# Patient Record
Sex: FEMALE | Race: OTHER | HISPANIC OR LATINO | Employment: FULL TIME | URBAN - METROPOLITAN AREA
[De-identification: names, ages, dates, MRNs, and addresses within clinical notes are randomized per-mention and may not be internally consistent; named-entity substitution may affect disease eponyms.]

---

## 2017-01-16 ENCOUNTER — ALLSCRIPTS OFFICE VISIT (OUTPATIENT)
Dept: OTHER | Facility: OTHER | Age: 32
End: 2017-01-16

## 2017-02-08 ENCOUNTER — ALLSCRIPTS OFFICE VISIT (OUTPATIENT)
Dept: OTHER | Facility: OTHER | Age: 32
End: 2017-02-08

## 2017-02-15 ENCOUNTER — ALLSCRIPTS OFFICE VISIT (OUTPATIENT)
Dept: OTHER | Facility: OTHER | Age: 32
End: 2017-02-15

## 2017-02-21 ENCOUNTER — APPOINTMENT (EMERGENCY)
Dept: RADIOLOGY | Facility: HOSPITAL | Age: 32
End: 2017-02-21
Payer: COMMERCIAL

## 2017-02-21 ENCOUNTER — HOSPITAL ENCOUNTER (EMERGENCY)
Facility: HOSPITAL | Age: 32
Discharge: HOME | End: 2017-02-21
Attending: EMERGENCY MEDICINE
Payer: COMMERCIAL

## 2017-02-21 VITALS
RESPIRATION RATE: 18 BRPM | SYSTOLIC BLOOD PRESSURE: 112 MMHG | DIASTOLIC BLOOD PRESSURE: 59 MMHG | TEMPERATURE: 97.7 F | HEART RATE: 70 BPM | OXYGEN SATURATION: 99 %

## 2017-02-21 DIAGNOSIS — K21.9 ACID REFLUX: ICD-10-CM

## 2017-02-21 DIAGNOSIS — R51.9 HEADACHE: Primary | ICD-10-CM

## 2017-02-21 LAB
ANION GAP SERPL CALCULATED.3IONS-SCNC: 9 MMOL/L (ref 4–13)
ATRIAL RATE: 64 BPM
BASOPHILS # BLD AUTO: 0 THOUSANDS/ΜL (ref 0–0.1)
BASOPHILS NFR BLD AUTO: 0 % (ref 0–1)
BILIRUB UR QL STRIP: NEGATIVE
BUN SERPL-MCNC: 12 MG/DL (ref 5–25)
CALCIUM SERPL-MCNC: 8.7 MG/DL (ref 8.3–10.1)
CHLORIDE SERPL-SCNC: 105 MMOL/L (ref 100–108)
CLARITY UR: CLEAR
CO2 SERPL-SCNC: 28 MMOL/L (ref 21–32)
COLOR UR: YELLOW
CREAT SERPL-MCNC: 0.66 MG/DL (ref 0.6–1.3)
EOSINOPHIL # BLD AUTO: 0.08 THOUSAND/ΜL (ref 0–0.61)
EOSINOPHIL NFR BLD AUTO: 1 % (ref 0–6)
ERYTHROCYTE [DISTWIDTH] IN BLOOD BY AUTOMATED COUNT: 13.1 % (ref 11.6–15.1)
GFR SERPL CREATININE-BSD FRML MDRD: >60 ML/MIN/1.73SQ M
GLUCOSE SERPL-MCNC: 70 MG/DL (ref 65–140)
GLUCOSE UR STRIP-MCNC: NEGATIVE MG/DL
HCG UR QL: NEGATIVE
HCT VFR BLD AUTO: 45 % (ref 34.8–46.1)
HGB BLD-MCNC: 16 G/DL (ref 11.5–15.4)
HGB UR QL STRIP.AUTO: NEGATIVE
KETONES UR STRIP-MCNC: ABNORMAL MG/DL
LEUKOCYTE ESTERASE UR QL STRIP: NEGATIVE
LYMPHOCYTES # BLD AUTO: 2.24 THOUSANDS/ΜL (ref 0.6–4.47)
LYMPHOCYTES NFR BLD AUTO: 25 % (ref 14–44)
MAGNESIUM SERPL-MCNC: 1.7 MG/DL (ref 1.6–2.6)
MCH RBC QN AUTO: 31.3 PG (ref 26.8–34.3)
MCHC RBC AUTO-ENTMCNC: 35.6 G/DL (ref 31.4–37.4)
MCV RBC AUTO: 88 FL (ref 82–98)
MONOCYTES # BLD AUTO: 0.78 THOUSAND/ΜL (ref 0.17–1.22)
MONOCYTES NFR BLD AUTO: 9 % (ref 4–12)
NEUTROPHILS # BLD AUTO: 5.78 THOUSANDS/ΜL (ref 1.85–7.62)
NEUTS SEG NFR BLD AUTO: 65 % (ref 43–75)
NITRITE UR QL STRIP: NEGATIVE
NRBC BLD AUTO-RTO: 0 /100 WBCS
P AXIS: 40 DEGREES
PH UR STRIP.AUTO: 5.5 [PH] (ref 4.5–8)
PLATELET # BLD AUTO: 245 THOUSANDS/UL (ref 149–390)
PMV BLD AUTO: 10.2 FL (ref 8.9–12.7)
POTASSIUM SERPL-SCNC: 3.8 MMOL/L (ref 3.5–5.3)
PR INTERVAL: 160 MS
PROT UR STRIP-MCNC: NEGATIVE MG/DL
QRS AXIS: 50 DEGREES
QRSD INTERVAL: 70 MS
QT INTERVAL: 364 MS
QTC INTERVAL: 375 MS
RBC # BLD AUTO: 5.11 MILLION/UL (ref 3.81–5.12)
SODIUM SERPL-SCNC: 142 MMOL/L (ref 136–145)
SP GR UR STRIP.AUTO: 1.02 (ref 1–1.03)
T WAVE AXIS: 57 DEGREES
UROBILINOGEN UR QL STRIP.AUTO: 0.2 E.U./DL
VENTRICULAR RATE: 64 BPM
WBC # BLD AUTO: 8.88 THOUSAND/UL (ref 4.31–10.16)

## 2017-02-21 PROCEDURE — 36415 COLL VENOUS BLD VENIPUNCTURE: CPT | Performed by: EMERGENCY MEDICINE

## 2017-02-21 PROCEDURE — 85025 COMPLETE CBC W/AUTO DIFF WBC: CPT | Performed by: EMERGENCY MEDICINE

## 2017-02-21 PROCEDURE — 81025 URINE PREGNANCY TEST: CPT | Performed by: EMERGENCY MEDICINE

## 2017-02-21 PROCEDURE — 83735 ASSAY OF MAGNESIUM: CPT | Performed by: EMERGENCY MEDICINE

## 2017-02-21 PROCEDURE — 93005 ELECTROCARDIOGRAM TRACING: CPT | Performed by: EMERGENCY MEDICINE

## 2017-02-21 PROCEDURE — 81002 URINALYSIS NONAUTO W/O SCOPE: CPT | Performed by: EMERGENCY MEDICINE

## 2017-02-21 PROCEDURE — 80048 BASIC METABOLIC PNL TOTAL CA: CPT | Performed by: EMERGENCY MEDICINE

## 2017-02-21 PROCEDURE — 96375 TX/PRO/DX INJ NEW DRUG ADDON: CPT

## 2017-02-21 PROCEDURE — 99285 EMERGENCY DEPT VISIT HI MDM: CPT

## 2017-02-21 PROCEDURE — 96374 THER/PROPH/DIAG INJ IV PUSH: CPT

## 2017-02-21 PROCEDURE — 81003 URINALYSIS AUTO W/O SCOPE: CPT

## 2017-02-21 PROCEDURE — 96361 HYDRATE IV INFUSION ADD-ON: CPT

## 2017-02-21 PROCEDURE — 71020 HB CHEST X-RAY 2VW FRONTAL&LATL: CPT

## 2017-02-21 RX ORDER — FAMOTIDINE 20 MG/1
20 TABLET, FILM COATED ORAL 2 TIMES DAILY
Qty: 40 TABLET | Refills: 0 | Status: SHIPPED | OUTPATIENT
Start: 2017-02-21 | End: 2021-09-23 | Stop reason: ALTCHOICE

## 2017-02-21 RX ORDER — DIPHENHYDRAMINE HYDROCHLORIDE 50 MG/ML
25 INJECTION INTRAMUSCULAR; INTRAVENOUS ONCE
Status: COMPLETED | OUTPATIENT
Start: 2017-02-21 | End: 2017-02-21

## 2017-02-21 RX ORDER — NAPROXEN 500 MG/1
500 TABLET ORAL 2 TIMES DAILY WITH MEALS
Qty: 20 TABLET | Refills: 0 | Status: SHIPPED | OUTPATIENT
Start: 2017-02-21 | End: 2021-09-23

## 2017-02-21 RX ORDER — KETOROLAC TROMETHAMINE 30 MG/ML
15 INJECTION, SOLUTION INTRAMUSCULAR; INTRAVENOUS ONCE
Status: COMPLETED | OUTPATIENT
Start: 2017-02-21 | End: 2017-02-21

## 2017-02-21 RX ORDER — METOCLOPRAMIDE HYDROCHLORIDE 5 MG/ML
10 INJECTION INTRAMUSCULAR; INTRAVENOUS ONCE
Status: COMPLETED | OUTPATIENT
Start: 2017-02-21 | End: 2017-02-21

## 2017-02-21 RX ADMIN — DIPHENHYDRAMINE HYDROCHLORIDE 25 MG: 50 INJECTION, SOLUTION INTRAMUSCULAR; INTRAVENOUS at 08:01

## 2017-02-21 RX ADMIN — SODIUM CHLORIDE 1000 ML: 0.9 INJECTION, SOLUTION INTRAVENOUS at 07:59

## 2017-02-21 RX ADMIN — FAMOTIDINE 20 MG: 10 INJECTION, SOLUTION INTRAVENOUS at 08:02

## 2017-02-21 RX ADMIN — METOCLOPRAMIDE HYDROCHLORIDE 10 MG: 5 INJECTION INTRAMUSCULAR; INTRAVENOUS at 08:05

## 2017-02-21 RX ADMIN — KETOROLAC TROMETHAMINE 15 MG: 30 INJECTION, SOLUTION INTRAMUSCULAR at 08:00

## 2017-04-24 ENCOUNTER — ALLSCRIPTS OFFICE VISIT (OUTPATIENT)
Dept: OTHER | Facility: OTHER | Age: 32
End: 2017-04-24

## 2017-04-24 LAB — HCG, QUALITATIVE (HISTORICAL): NEGATIVE

## 2017-07-18 ENCOUNTER — ALLSCRIPTS OFFICE VISIT (OUTPATIENT)
Dept: OTHER | Facility: OTHER | Age: 32
End: 2017-07-18

## 2017-07-18 LAB
BILIRUB UR QL STRIP: NORMAL
CLARITY UR: NORMAL
COLOR UR: YELLOW
HGB UR QL STRIP.AUTO: NORMAL
LEUKOCYTE ESTERASE UR QL STRIP: NORMAL
NITRITE UR QL STRIP: POSITIVE
PROT UR STRIP-MCNC: NORMAL MG/DL
UROBILINOGEN UR QL STRIP.AUTO: 0.2

## 2018-01-13 VITALS — DIASTOLIC BLOOD PRESSURE: 52 MMHG | BODY MASS INDEX: 25.06 KG/M2 | WEIGHT: 137 LBS | SYSTOLIC BLOOD PRESSURE: 102 MMHG

## 2018-01-14 VITALS — WEIGHT: 137 LBS | SYSTOLIC BLOOD PRESSURE: 97 MMHG | DIASTOLIC BLOOD PRESSURE: 67 MMHG | BODY MASS INDEX: 22.8 KG/M2

## 2018-01-14 VITALS — BODY MASS INDEX: 22.63 KG/M2 | WEIGHT: 136 LBS | SYSTOLIC BLOOD PRESSURE: 133 MMHG | DIASTOLIC BLOOD PRESSURE: 75 MMHG

## 2018-01-14 VITALS — SYSTOLIC BLOOD PRESSURE: 112 MMHG | WEIGHT: 137 LBS | BODY MASS INDEX: 22.8 KG/M2 | DIASTOLIC BLOOD PRESSURE: 72 MMHG

## 2018-01-14 VITALS — DIASTOLIC BLOOD PRESSURE: 71 MMHG | SYSTOLIC BLOOD PRESSURE: 108 MMHG | WEIGHT: 127 LBS | BODY MASS INDEX: 21.13 KG/M2

## 2018-01-17 NOTE — MISCELLANEOUS
Provider Comments  Provider Comments:   PT NO SHOW FOR PELVIC PAIN      Signatures   Electronically signed by : Avril Craven, ; Feb 16 2016  9:47AM EST                       (Author)    Electronically signed by :  MARILIN Ness; Feb 16 2016 11:53AM EST                       (Acknowledgement)    Electronically signed by : Ever Coker MD; Feb 18 2016  9:04AM EST

## 2018-07-17 ENCOUNTER — HOSPITAL ENCOUNTER (EMERGENCY)
Facility: HOSPITAL | Age: 33
Discharge: HOME | End: 2018-07-17
Attending: EMERGENCY MEDICINE | Admitting: EMERGENCY MEDICINE

## 2018-07-17 VITALS
TEMPERATURE: 97.8 F | DIASTOLIC BLOOD PRESSURE: 55 MMHG | RESPIRATION RATE: 18 BRPM | OXYGEN SATURATION: 100 % | HEART RATE: 76 BPM | SYSTOLIC BLOOD PRESSURE: 98 MMHG

## 2018-07-17 DIAGNOSIS — O26.93: ICD-10-CM

## 2018-07-17 DIAGNOSIS — R11.0 NAUSEA: ICD-10-CM

## 2018-07-17 DIAGNOSIS — R55 SYNCOPE: Primary | ICD-10-CM

## 2018-07-17 LAB
ALBUMIN SERPL BCP-MCNC: 2.7 G/DL (ref 3.5–5)
ALP SERPL-CCNC: 58 U/L (ref 46–116)
ALT SERPL W P-5'-P-CCNC: 17 U/L (ref 12–78)
ANION GAP SERPL CALCULATED.3IONS-SCNC: 5 MMOL/L (ref 4–13)
AST SERPL W P-5'-P-CCNC: 13 U/L (ref 5–45)
ATRIAL RATE: 78 BPM
BASOPHILS # BLD AUTO: 0.02 THOUSANDS/ΜL (ref 0–0.1)
BASOPHILS NFR BLD AUTO: 0 % (ref 0–1)
BILIRUB SERPL-MCNC: 0.36 MG/DL (ref 0.2–1)
BILIRUB UR QL STRIP: NEGATIVE
BUN SERPL-MCNC: 7 MG/DL (ref 5–25)
CALCIUM SERPL-MCNC: 8.1 MG/DL (ref 8.3–10.1)
CHLORIDE SERPL-SCNC: 109 MMOL/L (ref 100–108)
CLARITY UR: CLEAR
CO2 SERPL-SCNC: 23 MMOL/L (ref 21–32)
COLOR UR: YELLOW
COLOR, POC: NORMAL
CREAT SERPL-MCNC: 0.46 MG/DL (ref 0.6–1.3)
EOSINOPHIL # BLD AUTO: 0.03 THOUSAND/ΜL (ref 0–0.61)
EOSINOPHIL NFR BLD AUTO: 0 % (ref 0–6)
ERYTHROCYTE [DISTWIDTH] IN BLOOD BY AUTOMATED COUNT: 13.1 % (ref 11.6–15.1)
GFR SERPL CREATININE-BSD FRML MDRD: 132 ML/MIN/1.73SQ M
GLUCOSE SERPL-MCNC: 88 MG/DL (ref 65–140)
GLUCOSE UR STRIP-MCNC: NEGATIVE MG/DL
HCT VFR BLD AUTO: 37.6 % (ref 34.8–46.1)
HGB BLD-MCNC: 12.6 G/DL (ref 11.5–15.4)
HGB UR QL STRIP.AUTO: NEGATIVE
IMM GRANULOCYTES # BLD AUTO: 0.12 THOUSAND/UL (ref 0–0.2)
IMM GRANULOCYTES NFR BLD AUTO: 1 % (ref 0–2)
KETONES UR STRIP-MCNC: NEGATIVE MG/DL
LEUKOCYTE ESTERASE UR QL STRIP: NEGATIVE
LIPASE SERPL-CCNC: 137 U/L (ref 73–393)
LYMPHOCYTES # BLD AUTO: 1.82 THOUSANDS/ΜL (ref 0.6–4.47)
LYMPHOCYTES NFR BLD AUTO: 15 % (ref 14–44)
MCH RBC QN AUTO: 30.5 PG (ref 26.8–34.3)
MCHC RBC AUTO-ENTMCNC: 33.5 G/DL (ref 31.4–37.4)
MCV RBC AUTO: 91 FL (ref 82–98)
MONOCYTES # BLD AUTO: 0.9 THOUSAND/ΜL (ref 0.17–1.22)
MONOCYTES NFR BLD AUTO: 7 % (ref 4–12)
NEUTROPHILS # BLD AUTO: 9.6 THOUSANDS/ΜL (ref 1.85–7.62)
NEUTS SEG NFR BLD AUTO: 77 % (ref 43–75)
NITRITE UR QL STRIP: NEGATIVE
NRBC BLD AUTO-RTO: 0 /100 WBCS
P AXIS: 26 DEGREES
PH UR STRIP.AUTO: 7 [PH] (ref 4.5–8)
PLATELET # BLD AUTO: 205 THOUSANDS/UL (ref 149–390)
PMV BLD AUTO: 10 FL (ref 8.9–12.7)
POTASSIUM SERPL-SCNC: 3.5 MMOL/L (ref 3.5–5.3)
PR INTERVAL: 140 MS
PROT SERPL-MCNC: 6.4 G/DL (ref 6.4–8.2)
PROT UR STRIP-MCNC: NEGATIVE MG/DL
QRS AXIS: 42 DEGREES
QRSD INTERVAL: 72 MS
QT INTERVAL: 360 MS
QTC INTERVAL: 410 MS
RBC # BLD AUTO: 4.13 MILLION/UL (ref 3.81–5.12)
SODIUM SERPL-SCNC: 137 MMOL/L (ref 136–145)
SP GR UR STRIP.AUTO: 1.01 (ref 1–1.03)
T WAVE AXIS: 46 DEGREES
UROBILINOGEN UR QL STRIP.AUTO: 0.2 E.U./DL
VENTRICULAR RATE: 78 BPM
WBC # BLD AUTO: 12.49 THOUSAND/UL (ref 4.31–10.16)

## 2018-07-17 PROCEDURE — 85025 COMPLETE CBC W/AUTO DIFF WBC: CPT | Performed by: EMERGENCY MEDICINE

## 2018-07-17 PROCEDURE — 36415 COLL VENOUS BLD VENIPUNCTURE: CPT | Performed by: EMERGENCY MEDICINE

## 2018-07-17 PROCEDURE — 93005 ELECTROCARDIOGRAM TRACING: CPT

## 2018-07-17 PROCEDURE — 96374 THER/PROPH/DIAG INJ IV PUSH: CPT

## 2018-07-17 PROCEDURE — 99284 EMERGENCY DEPT VISIT MOD MDM: CPT

## 2018-07-17 PROCEDURE — 81003 URINALYSIS AUTO W/O SCOPE: CPT

## 2018-07-17 PROCEDURE — 83690 ASSAY OF LIPASE: CPT | Performed by: EMERGENCY MEDICINE

## 2018-07-17 PROCEDURE — 93010 ELECTROCARDIOGRAM REPORT: CPT | Performed by: INTERNAL MEDICINE

## 2018-07-17 PROCEDURE — 96361 HYDRATE IV INFUSION ADD-ON: CPT

## 2018-07-17 PROCEDURE — 80053 COMPREHEN METABOLIC PANEL: CPT | Performed by: EMERGENCY MEDICINE

## 2018-07-17 RX ORDER — ONDANSETRON 2 MG/ML
4 INJECTION INTRAMUSCULAR; INTRAVENOUS ONCE
Status: COMPLETED | OUTPATIENT
Start: 2018-07-17 | End: 2018-07-17

## 2018-07-17 RX ORDER — ONDANSETRON 4 MG/1
4 TABLET, ORALLY DISINTEGRATING ORAL EVERY 6 HOURS PRN
Qty: 20 TABLET | Refills: 0 | Status: SHIPPED | OUTPATIENT
Start: 2018-07-17 | End: 2021-09-23 | Stop reason: ALTCHOICE

## 2018-07-17 RX ADMIN — SODIUM CHLORIDE 1000 ML: 0.9 INJECTION, SOLUTION INTRAVENOUS at 08:49

## 2018-07-17 RX ADMIN — ONDANSETRON 4 MG: 2 INJECTION, SOLUTION INTRAMUSCULAR; INTRAVENOUS at 08:49

## 2018-07-17 NOTE — ED PROVIDER NOTES
History  Chief Complaint   Patient presents with    Syncope     syncopal episode at work, witnessed by co-workers, states she was shaking lasting 10-20 seconds, upon waking she had episode of nausea and dry-heaving  patient is 26 weeks pregnant, and states that she has had no complications with pregnancy,      29 F who is 26 weeks pregnant presenting after syncopal event while at work at [employer] today  C/o nausea but no pain  Pt was lowered to the ground by co-workers and did not sustain any trauma  No previous episodes of syncope  Denies headache, abd cramping, vaginal bleeding  Prior to Admission Medications   Prescriptions Last Dose Informant Patient Reported? Taking?   famotidine (PEPCID) 20 mg tablet   No No   Sig: Take 1 tablet by mouth 2 (two) times a day for 20 days   naproxen (NAPROSYN) 500 mg tablet   No No   Sig: Take 1 tablet by mouth 2 (two) times a day with meals for 10 days      Facility-Administered Medications: None       History reviewed  No pertinent past medical history  Past Surgical History:   Procedure Laterality Date     SECTION         Family History   Problem Relation Age of Onset    Family history unknown: Yes     I have reviewed and agree with the history as documented  Social History   Substance Use Topics    Smoking status: Never Smoker    Smokeless tobacco: Not on file    Alcohol use No        Review of Systems   Constitutional: Negative for appetite change, fatigue, fever and unexpected weight change  HENT: Negative for congestion, rhinorrhea and sore throat  Eyes: Negative for visual disturbance  Respiratory: Negative for apnea, cough, chest tightness, shortness of breath and wheezing  Cardiovascular: Negative for chest pain, palpitations and leg swelling  Gastrointestinal: Positive for abdominal pain  Negative for constipation, diarrhea, nausea and vomiting          Mid-epigatric pain   Genitourinary: Negative for difficulty urinating, dysuria, frequency, menstrual problem, pelvic pain, vaginal bleeding and vaginal discharge  Musculoskeletal: Negative for back pain and neck pain  Skin: Negative for pallor and rash  Neurological: Negative for dizziness, syncope, light-headedness and headaches  Psychiatric/Behavioral: Negative for sleep disturbance  All other systems reviewed and are negative  Physical Exam  Physical Exam   Constitutional: She is oriented to person, place, and time  She appears well-developed and well-nourished  HENT:   Head: Normocephalic and atraumatic  Mouth/Throat: Oropharynx is clear and moist    Eyes: Conjunctivae are normal  No scleral icterus  Neck: Normal range of motion  Neck supple  Cardiovascular: Normal rate, regular rhythm and normal heart sounds  Exam reveals no gallop and no friction rub  No murmur heard  Pulmonary/Chest: Effort normal and breath sounds normal  No respiratory distress  She has no wheezes  She has no rales  Abdominal: Soft  She exhibits no mass  There is no tenderness  There is no rebound and no guarding  No hernia  Gravid abdomen with uterus palpated above umbilicus   Musculoskeletal: Normal range of motion  She exhibits no edema, tenderness or deformity  Neurological: She is alert and oriented to person, place, and time  Skin: Skin is warm and dry  Capillary refill takes less than 2 seconds  No rash noted  No erythema  No pallor  Psychiatric: She has a normal mood and affect  Nursing note and vitals reviewed      Patient has + fetal movement with HR 150s    Vital Signs  ED Triage Vitals   Temperature Pulse Respirations Blood Pressure SpO2   07/17/18 0833 07/17/18 0833 07/17/18 0833 07/17/18 0834 07/17/18 0833   97 8 °F (36 6 °C) 83 18 110/57 100 %      Temp Source Heart Rate Source Patient Position - Orthostatic VS BP Location FiO2 (%)   07/17/18 0833 07/17/18 0833 07/17/18 0833 07/17/18 0833 --   Oral Monitor Sitting Right arm       Pain Score       07/17/18 1907       8           Vitals:    07/17/18 0833 07/17/18 0834 07/17/18 1103   BP:  110/57 98/55   Pulse: 83  76   Patient Position - Orthostatic VS: Sitting  Sitting       Visual Acuity  Visual Acuity      Most Recent Value   L Pupil Size (mm)  3   R Pupil Size (mm)  3          ED Medications  Medications    EMS REPLENISHMENT MED ( Does not apply Given to EMS 7/17/18 0835)   sodium chloride 0 9 % bolus 1,000 mL (1,000 mL Intravenous New Bag 7/17/18 0849)   ondansetron (ZOFRAN) injection 4 mg (4 mg Intravenous Given 7/17/18 0849)       Diagnostic Studies  Results Reviewed     Procedure Component Value Units Date/Time    POCT urinalysis dipstick [[id number]]  (Normal) Resulted:  07/17/18 1103    Lab Status:  Final result Specimen:  Urine Updated:  07/17/18 1103     Color, UA see chart    ED Urine Macroscopic [[id number]] Collected:  07/17/18 1112    Lab Status:  Final result Specimen:  Urine Updated:  07/17/18 1102     Color, UA Yellow     Clarity, UA Clear     pH, UA 7 0     Leukocytes, UA Negative     Nitrite, UA Negative     Protein, UA Negative mg/dl      Glucose, UA Negative mg/dl      Ketones, UA Negative mg/dl      Urobilinogen, UA 0 2 E U /dl      Bilirubin, UA Negative     Blood, UA Negative     Specific Gravity, UA 1 015    Narrative:       CLINITEK RESULT    Comprehensive metabolic panel [[id number]]  (Abnormal) Collected:  07/17/18 0849    Lab Status:  Final result Specimen:  Blood from Arm, Left Updated:  07/17/18 0919     Sodium 137 mmol/L      Potassium 3 5 mmol/L      Chloride 109 (H) mmol/L      CO2 23 mmol/L      Anion Gap 5 mmol/L      BUN 7 mg/dL      Creatinine 0 46 (L) mg/dL      Glucose 88 mg/dL      Calcium 8 1 (L) mg/dL      AST 13 U/L      ALT 17 U/L      Alkaline Phosphatase 58 U/L      Total Protein 6 4 g/dL      Albumin 2 7 (L) g/dL      Total Bilirubin 0 36 mg/dL      eGFR 132 ml/min/1 73sq m     Narrative:         National Kidney Disease Education Program recommendations are as follows:  GFR calculation is accurate only with a steady state creatinine  Chronic Kidney disease less than 60 ml/min/1 73 sq  meters  Kidney failure less than 15 ml/min/1 73 sq  meters      Lipase [[id number]]  (Normal) Collected:  07/17/18 0849    Lab Status:  Final result Specimen:  Blood from Arm, Left Updated:  07/17/18 0919     Lipase 137 u/L     CBC and differential [[id number]]  (Abnormal) Collected:  07/17/18 0849    Lab Status:  Final result Specimen:  Blood from Arm, Left Updated:  07/17/18 0900     WBC 12 49 (H) Thousand/uL      RBC 4 13 Million/uL      Hemoglobin 12 6 g/dL      Hematocrit 37 6 %      MCV 91 fL      MCH 30 5 pg      MCHC 33 5 g/dL      RDW 13 1 %      MPV 10 0 fL      Platelets 497 Thousands/uL      nRBC 0 /100 WBCs      Neutrophils Relative 77 (H) %      Immat GRANS % 1 %      Lymphocytes Relative 15 %      Monocytes Relative 7 %      Eosinophils Relative 0 %      Basophils Relative 0 %      Neutrophils Absolute 9 60 (H) Thousands/µL      Immature Grans Absolute 0 12 Thousand/uL      Lymphocytes Absolute 1 82 Thousands/µL      Monocytes Absolute 0 90 Thousand/µL      Eosinophils Absolute 0 03 Thousand/µL      Basophils Absolute 0 02 Thousands/µL                  No orders to display              Procedures  ECG 12 Lead Documentation  Date/Time: 7/17/2018 8:38 AM  Performed by: Brigette Desir  Authorized by: Brigette Desir     Indications / Diagnosis:  Syncope  ECG reviewed by me, the ED Provider: yes    Patient location:  ED  Previous ECG:     Comparison to cardiac monitor: Yes    Rate:     ECG rate:  78    ECG rate assessment: normal    Rhythm:     Rhythm: sinus rhythm    Ectopy:     Ectopy: none    QRS:     QRS axis:  Normal    QRS intervals:  Normal  Conduction:     Conduction: normal    ST segments:     ST segments:  Normal  T waves:     T waves: normal             Phone Contacts  ED Phone Contact    ED Course                               [facility] Time    Disposition  Final diagnoses: Syncope   Abnormal pregnancy, third trimester   Nausea     Time reflects when diagnosis was documented in both MDM as applicable and the Disposition within this note     Time User Action Codes Description Comment    7/17/2018 11:10 AM Gennette Lock T Add [R55] Syncope     7/17/2018 11:10 AM Gennette Lock T Add [O26 93] Abnormal pregnancy, third trimester     7/17/2018 11:11 AM Gennette Lock T Add [R11 0] Nausea       ED Disposition     ED Disposition Condition Comment    Discharge  Francine Rodriguez discharge to home/self care  Condition at discharge: Stable        Follow-up Information     Follow up With Specialties Details Why Zeeshan Babin MD Internal Medicine Schedule an appointment as soon as possible for a visit  [phone]  [address]  [phone]            Patient's Medications   Discharge Prescriptions    ONDANSETRON (ZOFRAN-ODT) 4 MG DISINTEGRATING TABLET    Take 1 tablet (4 mg total) by mouth every 6 (six) hours as needed for nausea or vomiting       Start Date: 7/17/2018 End Date: --       Order Dose: 4 mg       Quantity: 20 tablet    Refills: 0     No discharge procedures on file      ED Provider  Electronically Signed by           Alejo Winston DO  07/17/18 2848

## 2018-07-17 NOTE — DISCHARGE INSTRUCTIONS
Náusea y vómito severo, cuidados ambulatorios   INFORMACIÓN GENERAL:   La náusea y vómito severo  empieza de repente, empeora rápidamente y dura un corto periodo de Ar  La náusea y el vómito pueden ser causados por el embarazo, el alcohol, madi infección o medicamentos  Síntomas relacionados comunes incluyen los siguientes:   · Fiebre    · Inflamación abdominal    · Dolor, sensibilidad o un bulto en el abdomen    · Sonidos salpicantes que se escuchan en hernandez estómago cuando usted se mueve  Busque cuidados inmediatos para los siguientes síntomas:   · Tay en hernandez vómito o heces    · Dolor repentino y severo en el pecho y parte superior del abdomen después de shauna vomitado muy brandon    · Mareos, sequedad en la boca y sed    · Muy poca orina o ausencia completa de la misma    · Debilidad muscular, calambres musculares y dificultad para respirar    · Latidos cardíacos más rápidos de lo normal    · Vómito por más de 50 horas  El tratamiento para la náusea y el vómito severo  puede incluir medicamentos para calmar hernandez estómago y parar el vómito  Es probable que usted necesite de líquidos intravenosos si está deshidratado  Maneje hernandez náusea y vómito:   · [place] líquidos según indicaciones, para prevenir la deshidratación  Pregunte cuánto líquido radha [employer] hawa y cuáles líquidos son mejores para usted  Es probable que usted necesite radha un líquido de rehidratación oral  Arabella líquido contiene agua, sales y azúcares que son todos necesarios para reemplazar los líquidos que el cuerpo ha perdido  Pregunte qué tipo de líquidos de rehidratación oral radha, cuánto radha y dónde conseguirlos  · Consuma comidas pequeñas con más frecuencia  Consuma cantidades pequeñas de alimentos cada 2 o 3 horas aunque no sienta hambre  Los alimentos en hernandez estómago pueden llegar a [employer]  · Evite el estrés  Busque formas de relajarse y [place] hernandez estrés   Los chris de agustín debidos al estrés pueden incluso causar náusea y vómito  Descanse y [employer]  Programe madi ayesha con hernandez proveedor de chris según indicaciones:  Anote sandra preguntas para que las recuerde gutierrez sandra citas de seguimiento  ACUERDOS SOBRE HERNANDEZ CUIDADO:   Usted tiene el derecho de participar en la planificación de hernandez cuidado  Aprenda todo lo que pueda sobre hernandez condición y carlos darle tratamiento  Discuta con sandra médicos sandra opciones de tratamiento para juntos decidir el cuidado que usted quiere recibir  Usted siempre tiene el derecho a rechazar hernandez tratamiento  Esta información es sólo para uso en educación  Hernandez intención no es darle un consejo médico sobre enfermedades o tratamientos  Colsulte con hernandez Carollee Tru farmacéutico antes de seguir cualquier régimen médico para saber si es seguro y efectivo para usted  © 2014 [address] is for End User's use only and may not be sold, redistributed or otherwise used for commercial purposes  All illustrations and images included in CareNotes® are the copyrighted property of [employer]  or Nicanor Hensley

## 2020-10-13 ENCOUNTER — HOSPITAL ENCOUNTER (EMERGENCY)
Facility: HOSPITAL | Age: 35
Discharge: HOME | End: 2020-10-13
Attending: EMERGENCY MEDICINE | Admitting: EMERGENCY MEDICINE
Payer: COMMERCIAL

## 2020-10-13 ENCOUNTER — APPOINTMENT (EMERGENCY)
Dept: RADIOLOGY | Facility: HOSPITAL | Age: 35
End: 2020-10-13
Payer: COMMERCIAL

## 2020-10-13 VITALS
RESPIRATION RATE: 14 BRPM | BODY MASS INDEX: 26.27 KG/M2 | SYSTOLIC BLOOD PRESSURE: 110 MMHG | OXYGEN SATURATION: 99 % | HEART RATE: 61 BPM | DIASTOLIC BLOOD PRESSURE: 55 MMHG | TEMPERATURE: 97.5 F | WEIGHT: 157.85 LBS

## 2020-10-13 DIAGNOSIS — B34.9 VIRAL SYNDROME: Primary | ICD-10-CM

## 2020-10-13 PROCEDURE — 99283 EMERGENCY DEPT VISIT LOW MDM: CPT

## 2020-10-13 PROCEDURE — U0003 INFECTIOUS AGENT DETECTION BY NUCLEIC ACID (DNA OR RNA); SEVERE ACUTE RESPIRATORY SYNDROME CORONAVIRUS 2 (SARS-COV-2) (CORONAVIRUS DISEASE [COVID-19]), AMPLIFIED PROBE TECHNIQUE, MAKING USE OF HIGH THROUGHPUT TECHNOLOGIES AS DESCRIBED BY CMS-2020-01-R: HCPCS | Performed by: PHYSICIAN ASSISTANT

## 2020-10-13 PROCEDURE — 99285 EMERGENCY DEPT VISIT HI MDM: CPT | Performed by: EMERGENCY MEDICINE

## 2020-10-13 PROCEDURE — 71045 X-RAY EXAM CHEST 1 VIEW: CPT

## 2020-10-13 RX ORDER — BENZONATATE 100 MG/1
100 CAPSULE ORAL EVERY 8 HOURS
Qty: 21 CAPSULE | Refills: 0 | Status: SHIPPED | OUTPATIENT
Start: 2020-10-13 | End: 2021-09-23 | Stop reason: ALTCHOICE

## 2020-10-13 RX ORDER — FLUTICASONE PROPIONATE 50 MCG
1 SPRAY, SUSPENSION (ML) NASAL DAILY
Qty: 16 G | Refills: 0 | Status: SHIPPED | OUTPATIENT
Start: 2020-10-13 | End: 2021-09-23 | Stop reason: ALTCHOICE

## 2020-10-14 LAB — SARS-COV-2 RNA SPEC QL NAA+PROBE: DETECTED

## 2021-09-23 ENCOUNTER — HOSPITAL ENCOUNTER (EMERGENCY)
Facility: HOSPITAL | Age: 36
Discharge: HOME | End: 2021-09-23
Attending: EMERGENCY MEDICINE | Admitting: EMERGENCY MEDICINE
Payer: COMMERCIAL

## 2021-09-23 ENCOUNTER — APPOINTMENT (EMERGENCY)
Dept: CT IMAGING | Facility: HOSPITAL | Age: 36
End: 2021-09-23
Payer: COMMERCIAL

## 2021-09-23 VITALS
SYSTOLIC BLOOD PRESSURE: 108 MMHG | TEMPERATURE: 99.2 F | HEART RATE: 87 BPM | WEIGHT: 174.6 LBS | RESPIRATION RATE: 16 BRPM | DIASTOLIC BLOOD PRESSURE: 68 MMHG | BODY MASS INDEX: 29.06 KG/M2 | OXYGEN SATURATION: 98 %

## 2021-09-23 DIAGNOSIS — N12 PYELONEPHRITIS: Primary | ICD-10-CM

## 2021-09-23 DIAGNOSIS — N83.209 OVARIAN CYST: ICD-10-CM

## 2021-09-23 DIAGNOSIS — T83.32XA MALPOSITIONED INTRAUTERINE DEVICE (IUD), INITIAL ENCOUNTER: ICD-10-CM

## 2021-09-23 LAB
ALBUMIN SERPL BCP-MCNC: 3.3 G/DL (ref 3.5–5)
ALP SERPL-CCNC: 114 U/L (ref 46–116)
ALT SERPL W P-5'-P-CCNC: 44 U/L (ref 12–78)
ANION GAP SERPL CALCULATED.3IONS-SCNC: 9 MMOL/L (ref 4–13)
AST SERPL W P-5'-P-CCNC: 18 U/L (ref 5–45)
BACTERIA UR QL AUTO: ABNORMAL /HPF
BASOPHILS # BLD AUTO: 0.02 THOUSANDS/ΜL (ref 0–0.1)
BASOPHILS NFR BLD AUTO: 0 % (ref 0–1)
BILIRUB SERPL-MCNC: 0.76 MG/DL (ref 0.2–1)
BILIRUB UR QL STRIP: NEGATIVE
BUN SERPL-MCNC: 9 MG/DL (ref 5–25)
CALCIUM ALBUM COR SERPL-MCNC: 9.1 MG/DL (ref 8.3–10.1)
CALCIUM SERPL-MCNC: 8.5 MG/DL (ref 8.3–10.1)
CHLORIDE SERPL-SCNC: 102 MMOL/L (ref 100–108)
CLARITY UR: CLEAR
CO2 SERPL-SCNC: 28 MMOL/L (ref 21–32)
COLOR UR: ABNORMAL
CREAT SERPL-MCNC: 0.88 MG/DL (ref 0.6–1.3)
EOSINOPHIL # BLD AUTO: 0.01 THOUSAND/ΜL (ref 0–0.61)
EOSINOPHIL NFR BLD AUTO: 0 % (ref 0–6)
ERYTHROCYTE [DISTWIDTH] IN BLOOD BY AUTOMATED COUNT: 12.7 % (ref 11.6–15.1)
EXT PREG TEST URINE: NEGATIVE
EXT. CONTROL ED NAV: NORMAL
GFR SERPL CREATININE-BSD FRML MDRD: 85 ML/MIN/1.73SQ M
GLUCOSE SERPL-MCNC: 112 MG/DL (ref 65–140)
GLUCOSE UR STRIP-MCNC: NEGATIVE MG/DL
HCT VFR BLD AUTO: 45.3 % (ref 34.8–46.1)
HGB BLD-MCNC: 15.5 G/DL (ref 11.5–15.4)
HGB UR QL STRIP.AUTO: ABNORMAL
IMM GRANULOCYTES # BLD AUTO: 0.07 THOUSAND/UL (ref 0–0.2)
IMM GRANULOCYTES NFR BLD AUTO: 0 % (ref 0–2)
KETONES UR STRIP-MCNC: NEGATIVE MG/DL
LEUKOCYTE ESTERASE UR QL STRIP: ABNORMAL
LYMPHOCYTES # BLD AUTO: 1.74 THOUSANDS/ΜL (ref 0.6–4.47)
LYMPHOCYTES NFR BLD AUTO: 10 % (ref 14–44)
MCH RBC QN AUTO: 30.9 PG (ref 26.8–34.3)
MCHC RBC AUTO-ENTMCNC: 34.2 G/DL (ref 31.4–37.4)
MCV RBC AUTO: 90 FL (ref 82–98)
MONOCYTES # BLD AUTO: 2.13 THOUSAND/ΜL (ref 0.17–1.22)
MONOCYTES NFR BLD AUTO: 12 % (ref 4–12)
NEUTROPHILS # BLD AUTO: 13.45 THOUSANDS/ΜL (ref 1.85–7.62)
NEUTS SEG NFR BLD AUTO: 78 % (ref 43–75)
NITRITE UR QL STRIP: NEGATIVE
NON-SQ EPI CELLS URNS QL MICRO: ABNORMAL /HPF
NRBC BLD AUTO-RTO: 0 /100 WBCS
PH UR STRIP.AUTO: 6 [PH] (ref 4.5–8)
PLATELET # BLD AUTO: 243 THOUSANDS/UL (ref 149–390)
PMV BLD AUTO: 9.3 FL (ref 8.9–12.7)
POTASSIUM SERPL-SCNC: 3.9 MMOL/L (ref 3.5–5.3)
PROT SERPL-MCNC: 7.5 G/DL (ref 6.4–8.2)
PROT UR STRIP-MCNC: ABNORMAL MG/DL
RBC # BLD AUTO: 5.02 MILLION/UL (ref 3.81–5.12)
RBC #/AREA URNS AUTO: ABNORMAL /HPF
SODIUM SERPL-SCNC: 139 MMOL/L (ref 136–145)
SP GR UR STRIP.AUTO: 1.01 (ref 1–1.03)
UROBILINOGEN UR QL STRIP.AUTO: 1 E.U./DL
WBC # BLD AUTO: 17.42 THOUSAND/UL (ref 4.31–10.16)
WBC #/AREA URNS AUTO: ABNORMAL /HPF

## 2021-09-23 PROCEDURE — 99285 EMERGENCY DEPT VISIT HI MDM: CPT

## 2021-09-23 PROCEDURE — 81025 URINE PREGNANCY TEST: CPT

## 2021-09-23 PROCEDURE — 36415 COLL VENOUS BLD VENIPUNCTURE: CPT

## 2021-09-23 PROCEDURE — 81001 URINALYSIS AUTO W/SCOPE: CPT

## 2021-09-23 PROCEDURE — 99284 EMERGENCY DEPT VISIT MOD MDM: CPT

## 2021-09-23 PROCEDURE — 96375 TX/PRO/DX INJ NEW DRUG ADDON: CPT

## 2021-09-23 PROCEDURE — 80053 COMPREHEN METABOLIC PANEL: CPT

## 2021-09-23 PROCEDURE — 87147 CULTURE TYPE IMMUNOLOGIC: CPT

## 2021-09-23 PROCEDURE — 87086 URINE CULTURE/COLONY COUNT: CPT

## 2021-09-23 PROCEDURE — G1004 CDSM NDSC: HCPCS

## 2021-09-23 PROCEDURE — 85025 COMPLETE CBC W/AUTO DIFF WBC: CPT

## 2021-09-23 PROCEDURE — 74177 CT ABD & PELVIS W/CONTRAST: CPT

## 2021-09-23 PROCEDURE — 96365 THER/PROPH/DIAG IV INF INIT: CPT

## 2021-09-23 RX ORDER — ONDANSETRON 4 MG/1
4 TABLET, ORALLY DISINTEGRATING ORAL EVERY 6 HOURS PRN
Qty: 40 TABLET | Refills: 0 | Status: SHIPPED | OUTPATIENT
Start: 2021-09-23 | End: 2021-10-03

## 2021-09-23 RX ORDER — IBUPROFEN 600 MG/1
600 TABLET ORAL EVERY 6 HOURS PRN
Qty: 30 TABLET | Refills: 0 | Status: SHIPPED | OUTPATIENT
Start: 2021-09-23

## 2021-09-23 RX ORDER — ONDANSETRON 2 MG/ML
4 INJECTION INTRAMUSCULAR; INTRAVENOUS ONCE
Status: COMPLETED | OUTPATIENT
Start: 2021-09-23 | End: 2021-09-23

## 2021-09-23 RX ORDER — SULFAMETHOXAZOLE AND TRIMETHOPRIM 800; 160 MG/1; MG/1
1 TABLET ORAL 2 TIMES DAILY
Qty: 20 TABLET | Refills: 0 | Status: SHIPPED | OUTPATIENT
Start: 2021-09-23 | End: 2021-10-03

## 2021-09-23 RX ORDER — KETOROLAC TROMETHAMINE 30 MG/ML
15 INJECTION, SOLUTION INTRAMUSCULAR; INTRAVENOUS ONCE
Status: COMPLETED | OUTPATIENT
Start: 2021-09-23 | End: 2021-09-23

## 2021-09-23 RX ADMIN — CEFTRIAXONE SODIUM 1000 MG: 10 INJECTION, POWDER, FOR SOLUTION INTRAVENOUS at 18:16

## 2021-09-23 RX ADMIN — IOHEXOL 100 ML: 350 INJECTION, SOLUTION INTRAVENOUS at 17:07

## 2021-09-23 RX ADMIN — ONDANSETRON 4 MG: 2 INJECTION INTRAMUSCULAR; INTRAVENOUS at 16:24

## 2021-09-23 RX ADMIN — KETOROLAC TROMETHAMINE 15 MG: 30 INJECTION, SOLUTION INTRAMUSCULAR; INTRAVENOUS at 16:22

## 2021-09-23 NOTE — DISCHARGE INSTRUCTIONS
Stay hydrated, take medications prescribed as needed for pain and nausea  Take all of the antibiotic, even if you start feeling better  Return to ED for new or worsening symptoms as discussed (I e  high fever, inability to keep down food/liquids, uncontrollable pain)  Follow up with PCP and Obgyn  Follow up with OB/Gyn for the following findings  Findings from CT:   Bilateral pyelonephritis, with no evidence of abscess or hydronephrosis  Diffusion or bladder wall thickening suggesting cystitis  5 7 cm simple appearing left ovarian cyst  Incidental According to current guidelines Spike Braxtonk Neil Radiol 2020; [phone]) in this premenopausal woman, this should be followed up in 6 to 12 months by pelvic ultrasound  Malpositioned intrauterine device  Recommend repositioning

## 2021-09-23 NOTE — ED PROVIDER NOTES
History  Chief Complaint   Patient presents with    Flank Pain     Pt reports right sided flank pain for one week  Pt reports dysuria and blood in urine yesterday  Pt also reports nausea and HA  29-year-old female with no relevant past medical history presents to ED complaining of right flank pain x3 days  She states it has been there for longer but has worsened acutely over the past 3 days, along with mild headache, dysuria, urinary frequency, and nausea  She also states she noticed blood in her urine today  She reports this flank pain is constant, severe, and radiates into her right low back and across her abdomen to her suprapubic area  She states that the pain keeps her up at night past couple nights  She admits to chronic lower back pain but reports that this feels different  She reports she saw her PCP yesterday who gave her muscle relaxers- which she reports do not help with the pain  She states she also took Advil sometime in the early morning hours  Reports she did not take her temperature, had but states she felt as though she had a fever  She states she is still eating and drinking normally  She denies chills, change in vision, photophobia, sore throat, rhinorrhea, congestion, shortness of breath, chest pain, palpitations, anorexia, vomiting,     She reports history of ovarian cyst   She also reports history  section 15 years ago  She reports she has an IUD, and rarely gets her periods  She other past medical history or surgeries        History provided by:  Patient and medical records   used: Yes    Flank Pain  Pain location:  R flank  Pain quality: gnawing    Pain radiates to:  Back, suprapubic region and RLQ  Pain severity:  Moderate  Onset quality:  Gradual  Duration:  3 days  Timing:  Constant  Progression:  Worsening  Chronicity:  New  Context: not diet changes, not eating, not laxative use, not medication withdrawal, not recent illness, not recent sexual activity, not recent travel, not retching, not sick contacts, not suspicious food intake and not trauma    Relieved by:  Nothing  Worsened by:  Nothing  Ineffective treatments:  OTC medications, urination, eating, not moving, antacids, NSAIDs, bowel activity and movement  Associated symptoms: dysuria, fatigue, fever, hematuria and nausea    Associated symptoms: no anorexia, no belching, no chest pain, no chills, no constipation, no cough, no diarrhea, no hematemesis, no hematochezia, no melena, no shortness of breath, no sore throat, no vaginal bleeding, no vaginal discharge and no vomiting    Fever:     Temp source:  Subjective      None       History reviewed  No pertinent past medical history  Past Surgical History:   Procedure Laterality Date     SECTION         Family History   Family history unknown: Yes     I have reviewed and agree with the history as documented  E-Cigarette/Vaping     E-Cigarette/Vaping Substances     Social History     Tobacco Use    Smoking status: Never Smoker    Smokeless tobacco: Never Used   Substance Use Topics    Alcohol use: No    Drug use: No       Review of Systems   Constitutional: Positive for fatigue and fever  Negative for chills  HENT: Negative for ear pain and sore throat  Eyes: Negative for photophobia and visual disturbance  Respiratory: Negative for cough and shortness of breath  Cardiovascular: Negative for chest pain and palpitations  Gastrointestinal: Positive for nausea  Negative for abdominal pain, anorexia, constipation, diarrhea, hematemesis, hematochezia, melena and vomiting  Genitourinary: Positive for dysuria, flank pain and hematuria  Negative for vaginal bleeding and vaginal discharge  Musculoskeletal: Negative for arthralgias, back pain and myalgias  Skin: Negative for color change and rash  Neurological: Positive for headaches  Negative for seizures, syncope, weakness and numbness     All other systems reviewed and are negative  Physical Exam  Physical Exam  Vitals and nursing note reviewed  Constitutional:       General: She is not in acute distress  Appearance: Normal appearance  She is well-developed  She is not toxic-appearing  HENT:      Head: Normocephalic and atraumatic  Right Ear: External ear normal       Left Ear: External ear normal       Nose: Nose normal       Mouth/Throat:      Mouth: Mucous membranes are moist    Eyes:      General:         Right eye: No discharge  Left eye: No discharge  Conjunctiva/sclera: Conjunctivae normal       Pupils: Pupils are equal, round, and reactive to light  Cardiovascular:      Rate and Rhythm: Normal rate and regular rhythm  Heart sounds: Normal heart sounds  No murmur heard  Pulmonary:      Effort: Pulmonary effort is normal  No respiratory distress  Breath sounds: Normal breath sounds  Abdominal:      General: Bowel sounds are normal       Palpations: Abdomen is soft  Tenderness: There is abdominal tenderness in the suprapubic area  There is right CVA tenderness and left CVA tenderness  There is no guarding or rebound  Musculoskeletal:      Cervical back: Normal, normal range of motion and neck supple  Thoracic back: Normal       Lumbar back: Normal  No tenderness  Right lower leg: No edema  Left lower leg: No edema  Skin:     General: Skin is warm and dry  Capillary Refill: Capillary refill takes less than 2 seconds  Neurological:      Mental Status: She is alert and oriented to person, place, and time  Motor: No weakness     Psychiatric:         Mood and Affect: Mood normal          Behavior: Behavior normal          Vital Signs  ED Triage Vitals   Temperature Pulse Respirations Blood Pressure SpO2   09/23/21 1402 09/23/21 1402 09/23/21 1402 09/23/21 1402 09/23/21 1402   99 2 °F (37 3 °C) 102 18 125/66 97 %      Temp Source Heart Rate Source Patient Position - Orthostatic VS BP Location FiO2 (%) 09/23/21 1402 09/23/21 1402 09/23/21 1402 09/23/21 1402 --   Oral Monitor Sitting Right arm       Pain Score       09/23/21 1622       9           Vitals:    09/23/21 1402 09/23/21 1732   BP: 125/66 108/68   Pulse: 102 87   Patient Position - Orthostatic VS: Sitting Lying         Visual Acuity      ED Medications  Medications   ketorolac (TORADOL) injection 15 mg (15 mg Intravenous Given 9/23/21 1622)   ondansetron (ZOFRAN) injection 4 mg (4 mg Intravenous Given 9/23/21 1624)   iohexol (OMNIPAQUE) 350 MG/ML injection (SINGLE-DOSE) 100 mL (100 mL Intravenous Given 9/23/21 1707)   ceftriaxone (ROCEPHIN) 1 g/50 mL in dextrose IVPB (0 mg Intravenous Stopped 9/23/21 1846)       Diagnostic Studies  Results Reviewed     Procedure Component Value Units Date/Time    Urine culture [[id number]] Collected: 09/23/21 1713    Lab Status:  In process Specimen: Urine, Clean Catch Updated: 09/23/21 1906    Comprehensive metabolic panel [[id number]]  (Abnormal) Collected: 09/23/21 1620    Lab Status: Final result Specimen: Blood from Arm, Left Updated: 09/23/21 1649     Sodium 139 mmol/L      Potassium 3 9 mmol/L      Chloride 102 mmol/L      CO2 28 mmol/L      ANION GAP 9 mmol/L      BUN 9 mg/dL      Creatinine 0 88 mg/dL      Glucose 112 mg/dL      Calcium 8 5 mg/dL      Corrected Calcium 9 1 mg/dL      AST 18 U/L      ALT 44 U/L      Alkaline Phosphatase 114 U/L      Total Protein 7 5 g/dL      Albumin 3 3 g/dL      Total Bilirubin 0 76 mg/dL      eGFR 85 ml/min/1 73sq m     Narrative:      [place] guidelines for Chronic Kidney Disease (CKD):     Stage 1 with normal or high GFR (GFR > 90 mL/min/1 73 square meters)    Stage 2 Mild CKD (GFR = 60-89 mL/min/1 73 square meters)    Stage 3A Moderate CKD (GFR = 45-59 mL/min/1 73 square meters)    Stage 3B Moderate CKD (GFR = 30-44 mL/min/1 73 square meters)    Stage 4 Severe CKD (GFR = 15-29 mL/min/1 73 square meters)    Stage 5 End Stage CKD (GFR <15 mL/min/1 73 square meters)  Note: GFR calculation is accurate only with a steady state creatinine    Urine Microscopic [[id number]]  (Abnormal) Collected: 09/23/21 1630    Lab Status: Final result Specimen: Urine, Clean Catch Updated: 09/23/21 1649     RBC, UA 2-4 /hpf      WBC, UA 10-20 /hpf      Epithelial Cells Occasional /hpf      Bacteria, UA Innumerable /hpf     Urine culture [[id number]] Collected: 09/23/21 1630    Lab Status:  In process Specimen: Urine, Clean Catch Updated: 09/23/21 1649    CBC and differential [[id number]]  (Abnormal) Collected: 09/23/21 1620    Lab Status: Final result Specimen: Blood from Arm, Left Updated: 09/23/21 1635     WBC 17 42 Thousand/uL      RBC 5 02 Million/uL      Hemoglobin 15 5 g/dL      Hematocrit 45 3 %      MCV 90 fL      MCH 30 9 pg      MCHC 34 2 g/dL      RDW 12 7 %      MPV 9 3 fL      Platelets 600 Thousands/uL      nRBC 0 /100 WBCs      Neutrophils Relative 78 %      Immat GRANS % 0 %      Lymphocytes Relative 10 %      Monocytes Relative 12 %      Eosinophils Relative 0 %      Basophils Relative 0 %      Neutrophils Absolute 13 45 Thousands/µL      Immature Grans Absolute 0 07 Thousand/uL      Lymphocytes Absolute 1 74 Thousands/µL      Monocytes Absolute 2 13 Thousand/µL      Eosinophils Absolute 0 01 Thousand/µL      Basophils Absolute 0 02 Thousands/µL     POCT pregnancy, urine [[id number]]  (Normal) Resulted: 09/23/21 1633    Lab Status: Final result Updated: 09/23/21 1633     EXT PREG TEST UR (Ref: Negative) NEGATIVE     Control VALID    Urine Macroscopic, POC [[id number]]  (Abnormal) Collected: 09/23/21 1630    Lab Status: Final result Specimen: Urine Updated: 09/23/21 1631     Color, UA [place]     Clarity, UA Clear     pH, UA 6 0     Leukocytes, UA Trace     Nitrite, UA Negative     Protein, UA 30 (1+) mg/dl      Glucose, UA Negative mg/dl      Ketones, UA Negative mg/dl      Urobilinogen, UA 1 0 E U /dl      Bilirubin, UA Negative     Blood, UA Moderate Specific [place], UA 1 015    Narrative:      [facility] RESULT                 CT abdomen pelvis with contrast   Final Result by Claribel Estrada MD (09/23 1728)      Bilateral pyelonephritis, with no evidence of abscess or hydronephrosis  Diffusion or bladder wall thickening suggesting cystitis  5 7 cm simple appearing left ovarian cyst  Incidental According to current guidelines Rosie Pedraza Neil Radiol 2020; 21:847-129) in this premenopausal woman, this should be followed up in 6 to 12 months by pelvic ultrasound  Malpositioned intrauterine device  Recommend repositioning  The study was marked in EPIC for immediate notification regarding the incidental findings of left ovarian cyst and malpositioned IUD with follow-up recommendation  Workstation performed: [id number]                    Procedures  Procedures         ED Course                             SBIRT 22yo+      Most Recent Value   SBIRT (24 yo +)   In order to provide better care to our patients, we are screening all of our patients for alcohol and drug use  Would it be okay to ask you these screening questions? Unable to answer at this time Filed at: 09/23/2021 1620                    MDM  Number of Diagnoses or Management Options  Malpositioned intrauterine device (IUD), initial encounter  Ovarian cyst  Pyelonephritis  Diagnosis management comments: Flank pain, suprapubic abdominal pain, hematuria  Cystitis vs  Pyelonephritis vs  Nephrolithiasis vs  Appendicitis  CT with contrast ordered  Findings of bilateral pyelonephritis  No fever, pain tolerable, no vomiting  Patient not pregnant  Stable for outpatient treatment with follow up  Incidental findings of ovarian cyst and malpositioned IUD  Patient was already aware of and being followed for Ovarian Cyst  She was made aware of both incidental findings, told to follow up with OB/Gyn outpatient       All imaging and/or lab testing discussed with patient, strict return to ED precautions discussed  Patient recommended to follow up promptly with appropriate outpatient provider  Patient and/or family members verbalizes understanding and agrees with plan  Patient and/or family members were given opportunity to ask questions, all questions were answered at this time  Patient is stable for discharge      Portions of the record may have been created with voice recognition software  Occasional wrong word or "sound a like" substitutions may have occurred due to the inherent limitations of voice recognition software  Read the chart carefully and recognize, using context, where substitutions have occurred  Disposition  Final diagnoses:   Pyelonephritis - bilateral   Malpositioned intrauterine device (IUD), initial encounter   Ovarian cyst     Time reflects when diagnosis was documented in both MDM as applicable and the Disposition within this note     Time User Action Codes Description Comment    9/23/2021  5:58 PM Portia Mech Add [N12] Pyelonephritis     9/23/2021  5:58 PM Portia Mech Modify [N12] Pyelonephritis bilateral    9/23/2021  6:15 PM [place] Malpositioned intrauterine device (IUD), initial encounter     9/23/2021  6:16 PM Portia Mech Add [W62 830] Ovarian cyst       ED Disposition     ED Disposition Condition Date/Time Comment    Discharge Stable Thu Sep 23, 2021  6:24 PM Scar Mcgrath Popper discharge to home/self care              Follow-up Information     Follow up With Specialties Details Why Contact Info Additional Information    Chel Porras MD Internal Medicine Schedule an appointment as soon as possible for a visit in 2 days  [address] [place] [address],[address]  [phone]       [address] [place] [address] Ob/Gyn Obstetrics and Gynecology Schedule an appointment as soon as possible for a visit  IUD malposition, ovarian cyst [address] [facility] Emergency Medicine  As needed, If symptoms worsen [facility] [id number]  [facility] Emergency Department, [address] [facility] [address]  , [place], [place], [address]          Discharge Medication List as of 9/23/2021  6:24 PM      START taking these medications    Details   ibuprofen (MOTRIN) 600 mg tablet Take 1 tablet (600 mg total) by mouth every 6 (six) hours as needed for mild pain or moderate pain, Starting u 9/23/2021, Normal      ondansetron (ZOFRAN-ODT) 4 mg disintegrating tablet Take 1 tablet (4 mg total) by mouth every 6 (six) hours as needed for nausea or vomiting for up to 10 days, Starting u 9/23/2021, Until Sun 10/3/2021 at 2359, Normal      sulfamethoxazole-trimethoprim (BACTRIM DS) 800-160 mg per tablet Take 1 tablet by mouth 2 (two) times a day for 10 days smx-tmp DS (BACTRIM) 800-160 mg tabs (1tab q12 D10), Starting u 9/23/2021, Until Sun 10/3/2021, Normal           No discharge procedures on file      PDMP Review     None          ED Provider  Electronically Signed by           Jori Loya PA-C  09/24/21 2684

## 2021-09-23 NOTE — Clinical Note
Breanna Potter was seen and treated in our emergency department on 9/23/2021  Diagnosis:     Mirian Kim  is off the rest of the shift today  She may return on this date: May return if pain and nausea are tolerable  If you have any questions or concerns, please don't hesitate to call        Oliver Kanpp PA-C    ______________________________           _______________          _______________  Hospital Representative                              Date                                Time

## 2021-09-24 LAB
BACTERIA UR CULT: ABNORMAL
BACTERIA UR CULT: ABNORMAL

## 2022-01-20 ENCOUNTER — HOSPITAL ENCOUNTER (EMERGENCY)
Facility: HOSPITAL | Age: 37
Discharge: HOME | End: 2022-01-20
Attending: EMERGENCY MEDICINE | Admitting: EMERGENCY MEDICINE
Payer: COMMERCIAL

## 2022-01-20 VITALS
DIASTOLIC BLOOD PRESSURE: 77 MMHG | SYSTOLIC BLOOD PRESSURE: 130 MMHG | BODY MASS INDEX: 28.14 KG/M2 | RESPIRATION RATE: 20 BRPM | WEIGHT: 168.87 LBS | TEMPERATURE: 97.7 F | OXYGEN SATURATION: 100 % | HEIGHT: 65 IN | HEART RATE: 64 BPM

## 2022-01-20 DIAGNOSIS — N92.0 MENORRHAGIA: Primary | ICD-10-CM

## 2022-01-20 LAB
ABO GROUP BLD: NORMAL
ALBUMIN SERPL BCP-MCNC: 3.5 G/DL (ref 3.5–5)
ALP SERPL-CCNC: 105 U/L (ref 46–116)
ALT SERPL W P-5'-P-CCNC: 36 U/L (ref 12–78)
ANION GAP SERPL CALCULATED.3IONS-SCNC: 11 MMOL/L (ref 4–13)
APTT PPP: 26 SECONDS (ref 23–37)
AST SERPL W P-5'-P-CCNC: 20 U/L (ref 5–45)
BACTERIA UR QL AUTO: ABNORMAL /HPF
BASOPHILS # BLD AUTO: 0 THOUSANDS/ΜL (ref 0–0.1)
BASOPHILS NFR BLD AUTO: 0 % (ref 0–1)
BILIRUB SERPL-MCNC: 0.29 MG/DL (ref 0.2–1)
BILIRUB UR QL STRIP: NEGATIVE
BLD GP AB SCN SERPL QL: NEGATIVE
BUN SERPL-MCNC: 8 MG/DL (ref 5–25)
CALCIUM SERPL-MCNC: 8.4 MG/DL (ref 8.3–10.1)
CHLORIDE SERPL-SCNC: 106 MMOL/L (ref 100–108)
CLARITY UR: CLEAR
CO2 SERPL-SCNC: 23 MMOL/L (ref 21–32)
COLOR UR: YELLOW
CREAT SERPL-MCNC: 0.67 MG/DL (ref 0.6–1.3)
EOSINOPHIL # BLD AUTO: 0.05 THOUSAND/ΜL (ref 0–0.61)
EOSINOPHIL NFR BLD AUTO: 1 % (ref 0–6)
ERYTHROCYTE [DISTWIDTH] IN BLOOD BY AUTOMATED COUNT: 12.7 % (ref 11.6–15.1)
EXT PREG TEST URINE: NEGATIVE
EXT. CONTROL ED NAV: NORMAL
GFR SERPL CREATININE-BSD FRML MDRD: 114 ML/MIN/1.73SQ M
GLUCOSE SERPL-MCNC: 91 MG/DL (ref 65–140)
GLUCOSE UR STRIP-MCNC: NEGATIVE MG/DL
HCT VFR BLD AUTO: 43 % (ref 34.8–46.1)
HGB BLD-MCNC: 15.3 G/DL (ref 11.5–15.4)
HGB UR QL STRIP.AUTO: ABNORMAL
IMM GRANULOCYTES # BLD AUTO: 0.01 THOUSAND/UL (ref 0–0.2)
IMM GRANULOCYTES NFR BLD AUTO: 0 % (ref 0–2)
INR PPP: 1.2 (ref 0.84–1.19)
KETONES UR STRIP-MCNC: ABNORMAL MG/DL
LEUKOCYTE ESTERASE UR QL STRIP: NEGATIVE
LIPASE SERPL-CCNC: 179 U/L (ref 73–393)
LYMPHOCYTES # BLD AUTO: 2.48 THOUSANDS/ΜL (ref 0.6–4.47)
LYMPHOCYTES NFR BLD AUTO: 33 % (ref 14–44)
MCH RBC QN AUTO: 30.7 PG (ref 26.8–34.3)
MCHC RBC AUTO-ENTMCNC: 35.6 G/DL (ref 31.4–37.4)
MCV RBC AUTO: 86 FL (ref 82–98)
MONOCYTES # BLD AUTO: 0.79 THOUSAND/ΜL (ref 0.17–1.22)
MONOCYTES NFR BLD AUTO: 11 % (ref 4–12)
MUCOUS THREADS UR QL AUTO: ABNORMAL
NEUTROPHILS # BLD AUTO: 4.19 THOUSANDS/ΜL (ref 1.85–7.62)
NEUTS SEG NFR BLD AUTO: 55 % (ref 43–75)
NITRITE UR QL STRIP: NEGATIVE
NON-SQ EPI CELLS URNS QL MICRO: ABNORMAL /HPF
NRBC BLD AUTO-RTO: 0 /100 WBCS
PH UR STRIP.AUTO: 5.5 [PH] (ref 4.5–8)
PLATELET # BLD AUTO: 298 THOUSANDS/UL (ref 149–390)
PMV BLD AUTO: 9.6 FL (ref 8.9–12.7)
POTASSIUM SERPL-SCNC: 4 MMOL/L (ref 3.5–5.3)
PROT SERPL-MCNC: 6.9 G/DL (ref 6.4–8.2)
PROT UR STRIP-MCNC: ABNORMAL MG/DL
PROTHROMBIN TIME: 15 SECONDS (ref 11.6–14.5)
RBC # BLD AUTO: 4.99 MILLION/UL (ref 3.81–5.12)
RBC #/AREA URNS AUTO: ABNORMAL /HPF
RH BLD: POSITIVE
SODIUM SERPL-SCNC: 140 MMOL/L (ref 136–145)
SP GR UR STRIP.AUTO: >=1.03 (ref 1–1.03)
SPECIMEN EXPIRATION DATE: NORMAL
UROBILINOGEN UR QL STRIP.AUTO: 0.2 E.U./DL
WBC # BLD AUTO: 7.52 THOUSAND/UL (ref 4.31–10.16)
WBC #/AREA URNS AUTO: ABNORMAL /HPF

## 2022-01-20 PROCEDURE — 99285 EMERGENCY DEPT VISIT HI MDM: CPT | Performed by: PHYSICIAN ASSISTANT

## 2022-01-20 PROCEDURE — 85610 PROTHROMBIN TIME: CPT | Performed by: PHYSICIAN ASSISTANT

## 2022-01-20 PROCEDURE — 86900 BLOOD TYPING SEROLOGIC ABO: CPT | Performed by: PHYSICIAN ASSISTANT

## 2022-01-20 PROCEDURE — 99284 EMERGENCY DEPT VISIT MOD MDM: CPT

## 2022-01-20 PROCEDURE — 36415 COLL VENOUS BLD VENIPUNCTURE: CPT | Performed by: PHYSICIAN ASSISTANT

## 2022-01-20 PROCEDURE — 86850 RBC ANTIBODY SCREEN: CPT | Performed by: PHYSICIAN ASSISTANT

## 2022-01-20 PROCEDURE — 83690 ASSAY OF LIPASE: CPT | Performed by: PHYSICIAN ASSISTANT

## 2022-01-20 PROCEDURE — 96360 HYDRATION IV INFUSION INIT: CPT

## 2022-01-20 PROCEDURE — 85025 COMPLETE CBC W/AUTO DIFF WBC: CPT | Performed by: PHYSICIAN ASSISTANT

## 2022-01-20 PROCEDURE — 81025 URINE PREGNANCY TEST: CPT | Performed by: PHYSICIAN ASSISTANT

## 2022-01-20 PROCEDURE — 80053 COMPREHEN METABOLIC PANEL: CPT | Performed by: PHYSICIAN ASSISTANT

## 2022-01-20 PROCEDURE — 93005 ELECTROCARDIOGRAM TRACING: CPT

## 2022-01-20 PROCEDURE — 86901 BLOOD TYPING SEROLOGIC RH(D): CPT | Performed by: PHYSICIAN ASSISTANT

## 2022-01-20 PROCEDURE — 87591 N.GONORRHOEAE DNA AMP PROB: CPT | Performed by: PHYSICIAN ASSISTANT

## 2022-01-20 PROCEDURE — 85730 THROMBOPLASTIN TIME PARTIAL: CPT | Performed by: PHYSICIAN ASSISTANT

## 2022-01-20 PROCEDURE — 81001 URINALYSIS AUTO W/SCOPE: CPT

## 2022-01-20 PROCEDURE — 87491 CHLMYD TRACH DNA AMP PROBE: CPT | Performed by: PHYSICIAN ASSISTANT

## 2022-01-20 RX ORDER — TRANEXAMIC ACID 650 1/1
1300 TABLET ORAL 3 TIMES DAILY
Qty: 30 TABLET | Refills: 0 | Status: SHIPPED | OUTPATIENT
Start: 2022-01-20 | End: 2022-01-25

## 2022-01-20 RX ADMIN — SODIUM CHLORIDE 1000 ML: 0.9 INJECTION, SOLUTION INTRAVENOUS at 12:07

## 2022-01-20 NOTE — ED PROVIDER NOTES
History  Chief Complaint   Patient presents with    Vaginal Bleeding     Patient reports vaginal bleeding since receiving the deppo shot two month prior that is consistent  Patient complains of bilateral lower abdominal pain that she reports is a five and cramping like she has her cycle as well as nausea with the pain  She reports dizziness as well  Patient presents to the emergency department with ongoing vaginal bleeding  She reports about 6 weeks ago she got a Depo shot and has been having ongoing vaginal bleeding ever since then  She reports she is changing a regular pad about every hour or 2 or 3  Moves she states that the bleeding has been relatively consistent over the having weeks  Over the past couple weeks she has been having worsening dizziness  She called her OBGYN and she reports that they sent her here for evaluation  Records reviewed:  pt was seen at GYN - removed IUD and gave deop  And scheduling pt for tubal removal    +nausea, no vomiting, and mild suprapubic tenderness  No urinary symptoms  Continues to eat and drink well, no diarrhea  Concerned with ongoing bleeding and Gyn sent her in for eval              Prior to Admission Medications   Prescriptions Last Dose Informant Patient Reported? Taking?   ibuprofen (MOTRIN) 600 mg tablet   No No   Sig: Take 1 tablet (600 mg total) by mouth every 6 (six) hours as needed for mild pain or moderate pain   ondansetron (ZOFRAN-ODT) 4 mg disintegrating tablet   No No   Sig: Take 1 tablet (4 mg total) by mouth every 6 (six) hours as needed for nausea or vomiting for up to 10 days      Facility-Administered Medications: None       History reviewed  No pertinent past medical history  Past Surgical History:   Procedure Laterality Date     SECTION         Family History   Family history unknown: Yes     I have reviewed and agree with the history as documented      E-Cigarette/Vaping     E-Cigarette/Vaping Substances     Social History Tobacco Use    Smoking status: Never Smoker    Smokeless tobacco: Never Used   Substance Use Topics    Alcohol use: No    Drug use: No       Review of Systems   Constitutional: Negative for fever  Respiratory: Negative  Cardiovascular: Negative  Gastrointestinal: Positive for abdominal pain and diarrhea  Negative for vomiting  Genitourinary: Positive for vaginal bleeding  Neurological: Positive for dizziness  All other systems reviewed and are negative  Physical Exam  Physical Exam  Vitals and nursing note reviewed  Constitutional:       Appearance: She is well-developed  HENT:      Head: Normocephalic and atraumatic  Right Ear: External ear normal       Left Ear: External ear normal    Eyes:      Conjunctiva/sclera: Conjunctivae normal    Cardiovascular:      Rate and Rhythm: Normal rate and regular rhythm  Heart sounds: Normal heart sounds  Pulmonary:      Effort: Pulmonary effort is normal       Breath sounds: Normal breath sounds  Abdominal:      General: Bowel sounds are normal       Palpations: Abdomen is soft  Comments: Mild suprapubic tenderness   Genitourinary:     Cervix: Cervical bleeding present  No cervical motion tenderness or erythema  Adnexa: Right adnexa normal and left adnexa normal       Comments: Blood in vaginal vault -   Musculoskeletal:         General: Normal range of motion  Cervical back: Neck supple  Lymphadenopathy:      Cervical: No cervical adenopathy  Skin:     General: Skin is warm  Findings: No rash  Neurological:      Mental Status: She is alert     Psychiatric:         Behavior: Behavior normal          Vital Signs  ED Triage Vitals   Temperature Pulse Respirations Blood Pressure SpO2   01/20/22 1118 01/20/22 1118 01/20/22 1118 01/20/22 1118 01/20/22 1118   97 7 °F (36 5 °C) 69 20 118/57 98 %      Temp Source Heart Rate Source Patient Position - Orthostatic VS BP Location FiO2 (%)   01/20/22 1118 01/20/22 1118 01/20/22 1118 01/20/22 1118 --   Oral Monitor Sitting Right arm       Pain Score       01/20/22 1332       No Pain           Vitals:    01/20/22 1118 01/20/22 1332 01/20/22 1345   BP: 118/57 130/77 130/77   Pulse: 69 70 64   Patient Position - Orthostatic VS: Sitting Lying Lying         Visual Acuity      ED Medications  Medications   sodium chloride 0 9 % bolus 1,000 mL (0 mL Intravenous Stopped 1/20/22 1334)       Diagnostic Studies  Results Reviewed     Procedure Component Value Units Date/Time    Protime-INR [[id number]]  (Abnormal) Collected: 01/20/22 1205    Lab Status: Final result Specimen: Blood from Arm, Right Updated: 01/20/22 1339     Protime 15 0 seconds      INR 1 20    APTT [[id number]]  (Normal) Collected: 01/20/22 1205    Lab Status: Final result Specimen: Blood from Arm, Right Updated: 01/20/22 1339     PTT 26 seconds     Chlamydia/GC amplified DNA by PCR [[id number]] Collected: 01/20/22 1327    Lab Status:  In process Specimen: Cervix Updated: 01/20/22 1331    Lipase [[id number]]  (Normal) Collected: 01/20/22 1205    Lab Status: Final result Specimen: Blood from Arm, Right Updated: 01/20/22 1242     Lipase 179 u/L     CBC and differential [[id number]] Collected: 01/20/22 1205    Lab Status: Final result Specimen: Blood from Arm, Right Updated: 01/20/22 1214     WBC 7 52 Thousand/uL      RBC 4 99 Million/uL      Hemoglobin 15 3 g/dL      Hematocrit 43 0 %      MCV 86 fL      MCH 30 7 pg      MCHC 35 6 g/dL      RDW 12 7 %      MPV 9 6 fL      Platelets 083 Thousands/uL      nRBC 0 /100 WBCs      Neutrophils Relative 55 %      Immat GRANS % 0 %      Lymphocytes Relative 33 %      Monocytes Relative 11 %      Eosinophils Relative 1 %      Basophils Relative 0 %      Neutrophils Absolute 4 19 Thousands/µL      Immature Grans Absolute 0 01 Thousand/uL      Lymphocytes Absolute 2 48 Thousands/µL      Monocytes Absolute 0 79 Thousand/µL      Eosinophils Absolute 0 05 Thousand/µL      Basophils Absolute 0 00 Thousands/µL     Comprehensive metabolic panel [[id number]] Collected: 01/20/22 1205    Lab Status: In process Specimen: Blood from Arm, Right Updated: 01/20/22 1210    Urine Microscopic [[id number]] Collected: 01/20/22 1159    Lab Status: In process Specimen: Urine, Clean Catch Updated: 01/20/22 1203    POCT pregnancy, urine [[id number]]  (Normal) Resulted: 01/20/22 1201    Lab Status: Final result Updated: 01/20/22 1201     EXT PREG TEST UR (Ref: Negative) negative     Control valid    Urine Macroscopic, POC [[id number]]  (Abnormal) Collected: 01/20/22 1159    Lab Status: Final result Specimen: Urine Updated: 01/20/22 1201     Color, UA Yellow     Clarity, UA Clear     pH, UA 5 5     Leukocytes, UA Negative     Nitrite, UA Negative     Protein, UA Trace mg/dl      Glucose, UA Negative mg/dl      Ketones, UA Trace mg/dl      Urobilinogen, UA 0 2 E U /dl      Bilirubin, UA Negative     Blood, UA Large     Specific Gravity, UA >=1 030    Narrative:      CLINITEK RESULT                 No orders to display              Procedures  ECG 12 Lead Documentation Only    Date/Time: 1/20/2022 1:57 PM  Performed by: Bobby Thomas PA-C  Authorized by: Bobby Thomas PA-C     Indications / Diagnosis:  Dizziness  Patient location:  ED  Previous ECG:     Previous ECG:  Unavailable  Rate:     ECG rate:  68    ECG rate assessment: normal    Rhythm:     Rhythm: sinus rhythm    Ectopy:     Ectopy: none    Conduction:     Conduction: normal    ST segments:     ST segments:  Normal  T waves:     T waves: normal               ED Course  ED Course as of 01/20/22 1422   Thu Jan 20, 2022   1344 Discussed case with OBGYN: DR Steele Cutting - to start TXA 1300mg TID x5 days and FU with GYN  1415 Discussed results with pt and reasons to return  Pt feeling better and feels comfortable going home                                                MDM  Number of Diagnoses or Management Options  Menorrhagia: new and requires workup     Amount and/or Complexity of Data Reviewed  Clinical lab tests: reviewed  Decide to obtain previous medical records or to obtain history from someone other than the patient: yes  Review and summarize past medical records: yes  Discuss the patient with other providers: yes (GYN)    Patient Progress  Patient progress: improved      Disposition  Final diagnoses:   Menorrhagia     Time reflects when diagnosis was documented in both MDM as applicable and the Disposition within this note     Time User Action Codes Description Comment    1/20/2022  1:58 PM Melvina Barrett [N92 0] Menorrhagia       ED Disposition     ED Disposition Condition Date/Time Comment    Discharge Stable Thu Jan 20, 2022  1:58 PM Serge Birch discharge to home/self care  Follow-up Information     Follow up With Specialties Details Why Contact Info Additional Information    Mariah Teresa MD Internal Medicine   [address] [facility],[address] [address]  [place] [place] [address] Obstetrics and Gynecology   [address]  [address] [facility] [id number]  [address], [address], [address] [place] [address], [id number] [phone]          Patient's Medications   Discharge Prescriptions    TRANEXAMIC ACID 650 MG TABS    Take 2 tablets (1,300 mg total) by mouth 3 (three) times a day for 5 days       Start Date: 1/20/2022 End Date: 1/25/2022       Order Dose: 1,300 mg       Quantity: 30 tablet    Refills: 0       No discharge procedures on file      PDMP Review     None          ED Provider  Electronically Signed by           Nancy Roth PA-C  01/20/22 1419       Melvina Barrett PA-C  01/20/22 1428

## 2022-01-21 LAB
ATRIAL RATE: 60 BPM
C TRACH DNA SPEC QL NAA+PROBE: NEGATIVE
N GONORRHOEA DNA SPEC QL NAA+PROBE: NEGATIVE
P AXIS: 42 DEGREES
PR INTERVAL: 184 MS
QRS AXIS: 23 DEGREES
QRSD INTERVAL: 68 MS
QT INTERVAL: 406 MS
QTC INTERVAL: 406 MS
T WAVE AXIS: 34 DEGREES
VENTRICULAR RATE: 60 BPM

## 2022-01-21 PROCEDURE — 93010 ELECTROCARDIOGRAM REPORT: CPT | Performed by: INTERNAL MEDICINE

## 2025-01-25 ENCOUNTER — APPOINTMENT (EMERGENCY)
Dept: CT IMAGING | Facility: HOSPITAL | Age: 40
End: 2025-01-25
Payer: COMMERCIAL

## 2025-01-25 ENCOUNTER — HOSPITAL ENCOUNTER (EMERGENCY)
Facility: HOSPITAL | Age: 40
Discharge: HOME | End: 2025-01-25
Attending: EMERGENCY MEDICINE
Payer: COMMERCIAL

## 2025-01-25 VITALS
HEART RATE: 63 BPM | TEMPERATURE: 98.2 F | RESPIRATION RATE: 16 BRPM | OXYGEN SATURATION: 100 % | DIASTOLIC BLOOD PRESSURE: 75 MMHG | SYSTOLIC BLOOD PRESSURE: 132 MMHG

## 2025-01-25 DIAGNOSIS — R22.0 FACIAL SWELLING: Primary | ICD-10-CM

## 2025-01-25 DIAGNOSIS — K08.89 PAIN, DENTAL: ICD-10-CM

## 2025-01-25 DIAGNOSIS — L03.90 CELLULITIS: ICD-10-CM

## 2025-01-25 DIAGNOSIS — K04.7 PERIAPICAL ABSCESS: ICD-10-CM

## 2025-01-25 LAB
ALBUMIN SERPL BCG-MCNC: 4.4 G/DL (ref 3.5–5)
ALP SERPL-CCNC: 86 U/L (ref 34–104)
ALT SERPL W P-5'-P-CCNC: 12 U/L (ref 7–52)
ANION GAP SERPL CALCULATED.3IONS-SCNC: 9 MMOL/L (ref 4–13)
APTT PPP: 25 SECONDS (ref 23–34)
AST SERPL W P-5'-P-CCNC: 21 U/L (ref 13–39)
BASOPHILS # BLD AUTO: 0.02 THOUSANDS/ΜL (ref 0–0.1)
BASOPHILS NFR BLD AUTO: 0 % (ref 0–1)
BILIRUB SERPL-MCNC: 0.55 MG/DL (ref 0.2–1)
BUN SERPL-MCNC: 10 MG/DL (ref 5–25)
CALCIUM SERPL-MCNC: 9.1 MG/DL (ref 8.4–10.2)
CHLORIDE SERPL-SCNC: 104 MMOL/L (ref 96–108)
CO2 SERPL-SCNC: 22 MMOL/L (ref 21–32)
CREAT SERPL-MCNC: 0.67 MG/DL (ref 0.6–1.3)
EOSINOPHIL # BLD AUTO: 0.39 THOUSAND/ΜL (ref 0–0.61)
EOSINOPHIL NFR BLD AUTO: 5 % (ref 0–6)
ERYTHROCYTE [DISTWIDTH] IN BLOOD BY AUTOMATED COUNT: 13.3 % (ref 11.6–15.1)
EXT PREGNANCY TEST URINE: NEGATIVE
EXT. CONTROL: NORMAL
GFR SERPL CREATININE-BSD FRML MDRD: 111 ML/MIN/1.73SQ M
GLUCOSE SERPL-MCNC: 85 MG/DL (ref 65–140)
HCG SERPL QL: NEGATIVE
HCT VFR BLD AUTO: 45.8 % (ref 34.8–46.1)
HGB BLD-MCNC: 15.7 G/DL (ref 11.5–15.4)
IMM GRANULOCYTES # BLD AUTO: 0.02 THOUSAND/UL (ref 0–0.2)
IMM GRANULOCYTES NFR BLD AUTO: 0 % (ref 0–2)
INR PPP: 1.13 (ref 0.85–1.19)
LACTATE SERPL-SCNC: 0.6 MMOL/L (ref 0.5–2)
LYMPHOCYTES # BLD AUTO: 1.42 THOUSANDS/ΜL (ref 0.6–4.47)
LYMPHOCYTES NFR BLD AUTO: 18 % (ref 14–44)
MAGNESIUM SERPL-MCNC: 2 MG/DL (ref 1.9–2.7)
MCH RBC QN AUTO: 29.1 PG (ref 26.8–34.3)
MCHC RBC AUTO-ENTMCNC: 34.3 G/DL (ref 31.4–37.4)
MCV RBC AUTO: 85 FL (ref 82–98)
MONOCYTES # BLD AUTO: 0.92 THOUSAND/ΜL (ref 0.17–1.22)
MONOCYTES NFR BLD AUTO: 11 % (ref 4–12)
NEUTROPHILS # BLD AUTO: 5.35 THOUSANDS/ΜL (ref 1.85–7.62)
NEUTS SEG NFR BLD AUTO: 66 % (ref 43–75)
NRBC BLD AUTO-RTO: 0 /100 WBCS
PLATELET # BLD AUTO: 283 THOUSANDS/UL (ref 149–390)
PMV BLD AUTO: 9.4 FL (ref 8.9–12.7)
POTASSIUM SERPL-SCNC: 4.1 MMOL/L (ref 3.5–5.3)
PROT SERPL-MCNC: 7.5 G/DL (ref 6.4–8.4)
PROTHROMBIN TIME: 14.7 SECONDS (ref 12.3–15)
RBC # BLD AUTO: 5.39 MILLION/UL (ref 3.81–5.12)
SODIUM SERPL-SCNC: 135 MMOL/L (ref 135–147)
WBC # BLD AUTO: 8.12 THOUSAND/UL (ref 4.31–10.16)

## 2025-01-25 PROCEDURE — 85610 PROTHROMBIN TIME: CPT | Performed by: EMERGENCY MEDICINE

## 2025-01-25 PROCEDURE — 70487 CT MAXILLOFACIAL W/DYE: CPT

## 2025-01-25 PROCEDURE — 85025 COMPLETE CBC W/AUTO DIFF WBC: CPT | Performed by: EMERGENCY MEDICINE

## 2025-01-25 PROCEDURE — 85730 THROMBOPLASTIN TIME PARTIAL: CPT | Performed by: EMERGENCY MEDICINE

## 2025-01-25 PROCEDURE — 83605 ASSAY OF LACTIC ACID: CPT | Performed by: EMERGENCY MEDICINE

## 2025-01-25 PROCEDURE — 81025 URINE PREGNANCY TEST: CPT | Performed by: EMERGENCY MEDICINE

## 2025-01-25 PROCEDURE — 83735 ASSAY OF MAGNESIUM: CPT | Performed by: EMERGENCY MEDICINE

## 2025-01-25 PROCEDURE — 96375 TX/PRO/DX INJ NEW DRUG ADDON: CPT

## 2025-01-25 PROCEDURE — 36415 COLL VENOUS BLD VENIPUNCTURE: CPT | Performed by: EMERGENCY MEDICINE

## 2025-01-25 PROCEDURE — 80053 COMPREHEN METABOLIC PANEL: CPT | Performed by: EMERGENCY MEDICINE

## 2025-01-25 PROCEDURE — 96367 TX/PROPH/DG ADDL SEQ IV INF: CPT

## 2025-01-25 PROCEDURE — 99285 EMERGENCY DEPT VISIT HI MDM: CPT | Performed by: EMERGENCY MEDICINE

## 2025-01-25 PROCEDURE — 84703 CHORIONIC GONADOTROPIN ASSAY: CPT | Performed by: EMERGENCY MEDICINE

## 2025-01-25 PROCEDURE — 99284 EMERGENCY DEPT VISIT MOD MDM: CPT

## 2025-01-25 PROCEDURE — 96365 THER/PROPH/DIAG IV INF INIT: CPT

## 2025-01-25 RX ORDER — CLINDAMYCIN PHOSPHATE 600 MG/50ML
600 INJECTION, SOLUTION INTRAVENOUS ONCE
Status: COMPLETED | OUTPATIENT
Start: 2025-01-25 | End: 2025-01-25

## 2025-01-25 RX ORDER — NAPROXEN 250 MG/1
250 TABLET ORAL
Qty: 21 TABLET | Refills: 0 | Status: SHIPPED | OUTPATIENT
Start: 2025-01-25 | End: 2025-02-01

## 2025-01-25 RX ORDER — CLINDAMYCIN HYDROCHLORIDE 150 MG/1
450 CAPSULE ORAL 3 TIMES DAILY
Qty: 90 CAPSULE | Refills: 0 | Status: SHIPPED | OUTPATIENT
Start: 2025-01-25 | End: 2025-02-04

## 2025-01-25 RX ORDER — KETOROLAC TROMETHAMINE 30 MG/ML
30 INJECTION, SOLUTION INTRAMUSCULAR; INTRAVENOUS ONCE
Status: COMPLETED | OUTPATIENT
Start: 2025-01-25 | End: 2025-01-25

## 2025-01-25 RX ORDER — DEXAMETHASONE SODIUM PHOSPHATE 10 MG/ML
10 INJECTION, SOLUTION INTRAMUSCULAR; INTRAVENOUS ONCE
Status: COMPLETED | OUTPATIENT
Start: 2025-01-25 | End: 2025-01-25

## 2025-01-25 RX ORDER — ACETAMINOPHEN 10 MG/ML
1000 INJECTION, SOLUTION INTRAVENOUS ONCE
Status: COMPLETED | OUTPATIENT
Start: 2025-01-25 | End: 2025-01-25

## 2025-01-25 RX ADMIN — DEXAMETHASONE SODIUM PHOSPHATE 10 MG: 10 INJECTION INTRAMUSCULAR; INTRAVENOUS at 16:44

## 2025-01-25 RX ADMIN — IOHEXOL 85 ML: 350 INJECTION, SOLUTION INTRAVENOUS at 18:04

## 2025-01-25 RX ADMIN — SODIUM CHLORIDE 1000 ML: 0.9 INJECTION, SOLUTION INTRAVENOUS at 16:44

## 2025-01-25 RX ADMIN — ACETAMINOPHEN 1000 MG: 10 INJECTION INTRAVENOUS at 16:44

## 2025-01-25 RX ADMIN — KETOROLAC TROMETHAMINE 30 MG: 30 INJECTION, SOLUTION INTRAMUSCULAR; INTRAVENOUS at 17:33

## 2025-01-25 RX ADMIN — CLINDAMYCIN IN 5 PERCENT DEXTROSE 600 MG: 12 INJECTION, SOLUTION INTRAVENOUS at 17:33

## 2025-01-25 NOTE — ED PROVIDER NOTES
Time reflects when diagnosis was documented in both MDM as applicable and the Disposition within this note       Time User Action Codes Description Comment    1/25/2025  5:24 PM Tiki Contreras Add [R22.0] Facial swelling     1/25/2025  5:24 PM Tiki Contreras Add [K08.89] Pain, dental           ED Disposition       None          Assessment & Plan       Medical Decision Making      differential diagnosis includes but not limited to: Dental caries, pulpitis, fracture, periapical abscess, periodontitis, abscess, tooth grinding, neuritis, sinus infection, neuropathic pain, gingivitis, ludwigs angina    Based on history and physical concerning for periodontal abscess versus osteomyelitis versus cellulitis    Will obtain lactic acid as well as procalcitonin rule out sepsis; CBC to assess for leukocytosis or anemia; CMP to assess for ALANNA versus electrolyte abnormalities versus elevated LFTs.  Will also obtain pregnancy test.      Problems Addressed:  Facial swelling: acute illness or injury  Pain, dental: acute illness or injury    Amount and/or Complexity of Data Reviewed  Independent Historian:      Details:     Family at bedside      External Data Reviewed: notes.  Labs: ordered. Decision-making details documented in ED Course.     Details:   No leukocytosis or anemia.  Mild hemoconcentration and no thrombocytopenia  No acute kidney injury or electrolyte abnormality  Lactic acid within normal range  Pregnancy negative        Radiology: ordered.     Details:     CT max face with IV contrast    Risk  Prescription drug management.        ED Course as of 01/25/25 1729   Sat Jan 25, 2025   1624 Patient is a 39-year-old female coming in today with right-sided facial swelling that started several days ago and progressively worsened.  On exam she does have swelling, erythema and tenderness to the right mandibular region mid portion.  She is maintaining airway and secretions.  There is no brawniness under the tongue.  Will  "start septic workup, give Solu-Medrol, Toradol, acetaminophen as well as IV fluids and CT scan      Disclosure: Voice to text software was used in the preparation of this document and could have resulted in translational errors.      Occasional wrong word or \"sound a like\" substitutions may have occurred due to the inherent limitations of voice recognition software.  Read the chart carefully and recognize, using context, where substitutions have occurred.       I have independently reviewed external records are available to me to the level of detail possible within the time constraints of my patient care responsibilities in the ED.       1654 CBC and differential(!)  Mild hemoconcentration       1710 Lactic acid, plasma (w/reflex if result > 2.0)  WNL       1710 Labs without acute abnormality       1722 POCT pregnancy, urine  Negative       1724 CT ordered.  Will also give Toradol as pregnancy is negative and clindamycin.       1724 Patient resting in bed.  Patient states that she feels minimal improvement.  The swelling has not extended.  She was updated on labs.  Pending Toradol, clindamycin and CT.      Signed out to Dr Culver follow up CT and dispo           Medications   sodium chloride 0.9 % bolus 1,000 mL (1,000 mL Intravenous New Bag 1/25/25 1644)   ketorolac (TORADOL) injection 30 mg (has no administration in time range)   clindamycin in dextrose 5% (Cleocin) IVPB (premix) 600 mg 50 mL (has no administration in time range)   acetaminophen (Ofirmev) injection 1,000 mg (0 mg Intravenous Stopped 1/25/25 1714)   dexamethasone (PF) (DECADRON) injection 10 mg (10 mg Intravenous Given 1/25/25 1644)       ED Risk Strat Scores                          SBIRT 20yo+      Flowsheet Row Most Recent Value   Initial Alcohol Screen: US AUDIT-C     1. How often do you have a drink containing alcohol? 0 Filed at: 01/25/2025 1605   2. How many drinks containing alcohol do you have on a typical day you are drinking?  0 Filed at: " 2025 1605   3a. Male UNDER 65: How often do you have five or more drinks on one occasion? 0 Filed at: 2025 1605   3b. FEMALE Any Age, or MALE 65+: How often do you have 4 or more drinks on one occassion? 0 Filed at: 2025 1605   Audit-C Score 0 Filed at: 2025 1605   ADA: How many times in the past year have you...    Used an illegal drug or used a prescription medication for non-medical reasons? Never Filed at: 2025 1605                            History of Present Illness       Chief Complaint   Patient presents with    Facial Swelling     Right sided facial swelling and dental pain. Sx x5 days. Taking otc medications without relief.        History reviewed. No pertinent past medical history.   Past Surgical History:   Procedure Laterality Date     SECTION        Family History   Family history unknown: Yes      Social History     Tobacco Use    Smoking status: Never    Smokeless tobacco: Never   Substance Use Topics    Alcohol use: No    Drug use: No      E-Cigarette/Vaping      E-Cigarette/Vaping Substances      I have reviewed and agree with the history as documented.     Patient is otherwise healthy pleasant 39-year-old female with no significant past medical history coming in today with symptoms that started about 5 days ago.  She reports that it was a dental pain that is progressively worsening and is now's more swollen today.  She has no falls or injuries.  She has subjective fevers and chills.  She has no recent dental work or surgeries.  She has been taken Tylenol and/or Motrin without any relief.  She has decreased p.o. intake due to pain and swelling.  She has no neck pain, headache or blurry vision.      History provided by:  Patient and medical records   used: No    Dental Pain  Location:  Lower  Lower teeth location:  19/LL 1st molar, 20/LL 2nd bicuspid and 21/LL 1st bicuspid  Quality:  Constant, dull and radiating  Severity:  Moderate  Onset  quality:  Gradual  Duration:  5 days  Timing:  Constant  Progression:  Worsening  Chronicity:  New  Context: not abscess, cap still on, not crown fracture, not dental caries, not dental fracture, not enamel fracture, filling intact, not intrusion, not malocclusion, normal dentition, not recent dental surgery and not trauma    Relieved by:  Nothing  Worsened by:  Jaw movement, pressure and touching  Ineffective treatments:  Acetaminophen, rest and NSAIDs  Associated symptoms: facial pain, facial swelling and fever (subjective)    Associated symptoms: no congestion, no difficulty swallowing, no drooling, no gum swelling, no headaches, no neck pain, no neck swelling, no oral bleeding, no oral lesions and no trismus    Risk factors: lack of dental care    Risk factors: no alcohol problem, no cancer, no chewing tobacco use, no diabetes, no immunosuppression, no periodontal disease and no smoking        Review of Systems   Constitutional:  Positive for fever (subjective). Negative for chills.   HENT:  Positive for facial swelling. Negative for congestion, drooling, ear pain, mouth sores and sore throat.    Eyes: Negative.  Negative for pain and visual disturbance.   Respiratory:  Negative for cough and shortness of breath.    Cardiovascular: Negative.  Negative for chest pain and palpitations.   Gastrointestinal: Negative.  Negative for abdominal pain and vomiting.   Endocrine: Negative.    Genitourinary: Negative.  Negative for dysuria and hematuria.   Musculoskeletal: Negative.  Negative for arthralgias, back pain and neck pain.   Skin:  Negative for color change and rash.   Neurological: Negative.  Negative for seizures, syncope and headaches.   Hematological: Negative.    Psychiatric/Behavioral: Negative.     All other systems reviewed and are negative.          Objective       ED Triage Vitals [01/25/25 1604]   Temperature Pulse Blood Pressure Respirations SpO2 Patient Position - Orthostatic VS   98.2 °F (36.8 °C) 78  140/92 18 98 % --      Temp src Heart Rate Source BP Location FiO2 (%) Pain Score    -- -- -- -- 10 - Worst Possible Pain      Vitals      Date and Time Temp Pulse SpO2 Resp BP Pain Score FACES Pain Rating User   01/25/25 1604 98.2 °F (36.8 °C) 78 98 % 18 140/92 10 - Worst Possible Pain -- BROOKLYNN            Physical Exam  Vitals and nursing note reviewed.   Constitutional:       General: She is not in acute distress.     Appearance: She is well-developed.   HENT:      Head: Normocephalic and atraumatic.        Right Ear: Tympanic membrane, ear canal and external ear normal.      Left Ear: Tympanic membrane, ear canal and external ear normal.      Nose: Nose normal.      Mouth/Throat:     Eyes:      General: Lids are normal. Gaze aligned appropriately.      Extraocular Movements: Extraocular movements intact.      Conjunctiva/sclera: Conjunctivae normal.      Pupils: Pupils are equal, round, and reactive to light.   Neck:      Trachea: Trachea normal.   Cardiovascular:      Rate and Rhythm: Normal rate and regular rhythm.      Pulses:           Radial pulses are 2+ on the right side and 2+ on the left side.        Dorsalis pedis pulses are 2+ on the right side and 2+ on the left side.      Heart sounds: Normal heart sounds, S1 normal and S2 normal. No murmur heard.  Pulmonary:      Effort: Pulmonary effort is normal. No respiratory distress.      Breath sounds: Normal breath sounds.   Abdominal:      Palpations: Abdomen is soft.      Tenderness: There is no abdominal tenderness.   Musculoskeletal:         General: No swelling.      Cervical back: Normal range of motion and neck supple. No rigidity.      Right lower leg: No edema.      Left lower leg: No edema.   Lymphadenopathy:      Cervical: Cervical adenopathy present.      Right cervical: Superficial cervical adenopathy present.      Left cervical: Superficial cervical adenopathy present.   Skin:     General: Skin is warm and dry.      Capillary Refill: Capillary  refill takes less than 2 seconds.   Neurological:      General: No focal deficit present.      Mental Status: She is alert and oriented to person, place, and time.      GCS: GCS eye subscore is 4. GCS verbal subscore is 5. GCS motor subscore is 6.      Cranial Nerves: Cranial nerves 2-12 are intact.      Sensory: Sensation is intact.      Motor: Motor function is intact.      Coordination: Coordination is intact.   Psychiatric:         Mood and Affect: Mood normal.         Behavior: Behavior normal.         Results Reviewed       Procedure Component Value Units Date/Time    POCT pregnancy, urine [[id number]]  (Normal) Collected: 01/25/25 1718    Lab Status: Final result Updated: 01/25/25 1718     EXT Preg Test, Ur Negative     Control Valid    hCG, qualitative pregnancy [[id number]] Collected: 01/25/25 1644    Lab Status: In process Specimen: Blood from Arm, Right Updated: 01/25/25 1713    Comprehensive metabolic panel [[id number]] Collected: 01/25/25 1644    Lab Status: Final result Specimen: Blood from Arm, Right Updated: 01/25/25 1708     Sodium 135 mmol/L      Potassium 4.1 mmol/L      Chloride 104 mmol/L      CO2 22 mmol/L      ANION GAP 9 mmol/L      BUN 10 mg/dL      Creatinine 0.67 mg/dL      Glucose 85 mg/dL      Calcium 9.1 mg/dL      AST 21 U/L      ALT 12 U/L      Alkaline Phosphatase 86 U/L      Total Protein 7.5 g/dL      Albumin 4.4 g/dL      Total Bilirubin 0.55 mg/dL      eGFR 111 ml/min/1.73sq m     Narrative:      [employer] guidelines for Chronic Kidney Disease (CKD):     Stage 1 with normal or high GFR (GFR > 90 mL/min/1.73 square meters)    Stage 2 Mild CKD (GFR = 60-89 mL/min/1.73 square meters)    Stage 3A Moderate CKD (GFR = 45-59 mL/min/1.73 square meters)    Stage 3B Moderate CKD (GFR = 30-44 mL/min/1.73 square meters)    Stage 4 Severe CKD (GFR = 15-29 mL/min/1.73 square meters)    Stage 5 End Stage CKD (GFR <15 mL/min/1.73 square meters)  Note: GFR calculation is  accurate only with a steady state creatinine    Lactic acid, plasma (w/reflex if result > 2.0) [[id number]]  (Normal) Collected: 01/25/25 1644    Lab Status: Final result Specimen: Blood from Arm, Right Updated: 01/25/25 1708     LACTIC ACID 0.6 mmol/L     Narrative:      Result may be elevated if tourniquet was used during collection.    Magnesium [[id number]]  (Normal) Collected: 01/25/25 1644    Lab Status: Final result Specimen: Blood from Arm, Right Updated: 01/25/25 1708     Magnesium 2.0 mg/dL     Protime-INR [[id number]]  (Normal) Collected: 01/25/25 1644    Lab Status: Final result Specimen: Blood from Arm, Right Updated: 01/25/25 1706     Protime 14.7 seconds      INR 1.13    Narrative:      INR Therapeutic Range    Indication                                             INR Range      Atrial Fibrillation                                               2.0-3.0  Hypercoagulable State                                    2.0.2.3  Left Ventricular Asist Device                            2.0-3.0  Mechanical Heart Valve                                  -    Aortic(with afib, MI, embolism, HF, LA enlargement,    and/or coagulopathy)                                     2.0-3.0 (2.5-3.5)     Mitral                                                             2.5-3.5  Prosthetic/Bioprosthetic Heart Valve               2.0-3.0  Venous thromboembolism (VTE: VT, PE        2.0-3.0    APTT [[id number]]  (Normal) Collected: 01/25/25 1644    Lab Status: Final result Specimen: Blood from Arm, Right Updated: 01/25/25 1706     PTT 25 seconds     CBC and differential [[id number]]  (Abnormal) Collected: 01/25/25 1644    Lab Status: Final result Specimen: Blood from Arm, Right Updated: 01/25/25 1651     WBC 8.12 Thousand/uL      RBC 5.39 Million/uL      Hemoglobin 15.7 g/dL      Hematocrit 45.8 %      MCV 85 fL      MCH 29.1 pg      MCHC 34.3 g/dL      RDW 13.3 %      MPV 9.4 fL      Platelets 283 Thousands/uL      nRBC 0 /100 WBCs       Segmented % 66 %      Immature Grans % 0 %      Lymphocytes % 18 %      Monocytes % 11 %      Eosinophils Relative 5 %      Basophils Relative 0 %      Absolute Neutrophils 5.35 Thousands/µL      Absolute Immature Grans 0.02 Thousand/uL      Absolute Lymphocytes 1.42 Thousands/µL      Absolute Monocytes 0.92 Thousand/µL      Eosinophils Absolute 0.39 Thousand/µL      Basophils Absolute 0.02 Thousands/µL             CT facial bones with contrast    (Results Pending)       Procedures    ED Medication and Procedure Management   Prior to Admission Medications   Prescriptions Last Dose Informant Patient Reported? Taking?   ibuprofen (MOTRIN) 600 mg tablet   No No   Sig: Take 1 tablet (600 mg total) by mouth every 6 (six) hours as needed for mild pain or moderate pain   ondansetron (ZOFRAN-ODT) 4 mg disintegrating tablet   No No   Sig: Take 1 tablet (4 mg total) by mouth every 6 (six) hours as needed for nausea or vomiting for up to 10 days      Facility-Administered Medications: None     Patient's Medications   Discharge Prescriptions    No medications on file     No discharge procedures on file.  ED SEPSIS DOCUMENTATION   Time reflects when diagnosis was documented in both MDM as applicable and the Disposition within this note       Time User Action Codes Description Comment    1/25/2025  5:24 PM Tiki Contreras Add [R22.0] Facial swelling     1/25/2025  5:24 PM Tiki Contreras Add [K08.89] Pain, dental            Initial Sepsis Screening       Row Name 01/25/25 9314                Is the patient's history suggestive of a new or worsening infection? Yes (Proceed)  -NB        Suspected source of infection suspect infection, source unknown  -NB        Indicate SIRS criteria --        Are two or more of the above signs & symptoms of infection both present and new to the patient? No  -NB                  User Key  (r) = Recorded By, (t) = Taken By, (c) = Cosigned By      Initials Name Provider Type    GANESH GARCIA  DO Diane Physician                       Tiki Contreras,   01/25/25 4772

## 2025-01-25 NOTE — Clinical Note
Kylah Arita was seen and treated in our emergency department on 1/25/2025.    No restrictions            Diagnosis:     Kylah  may return to work on return date.    She may return on this date: 01/27/2025         If you have any questions or concerns, please don't hesitate to call.      Renard Culver MD    ______________________________           _______________          _______________  Hospital Representative                              Date                                Time

## 2025-01-25 NOTE — SEPSIS NOTE
Sepsis Note   Kylah Arita 39 y.o. female MRN: [id number]  Unit/Bed#: ED-10 Encounter: [id number]       Initial Sepsis Screening       Row Name 01/25/25 2757                Is the patient's history suggestive of a new or worsening infection? Yes (Proceed)  -NB        Suspected source of infection suspect infection, source unknown  -NB        Indicate SIRS criteria --        Are two or more of the above signs & symptoms of infection both present and new to the patient? No  -NB                  User Key  (r) = Recorded By, (t) = Taken By, (c) = Cosigned By      Initials Name Provider Type    NB Tiki Contreras DO Physician                        There is no height or weight on file to calculate BMI.  Wt Readings from Last 1 Encounters:   01/20/22 76.6 kg (168 lb 14 oz)        Patient weight not recorded